# Patient Record
Sex: MALE | Race: WHITE | NOT HISPANIC OR LATINO | Employment: OTHER | URBAN - NONMETROPOLITAN AREA
[De-identification: names, ages, dates, MRNs, and addresses within clinical notes are randomized per-mention and may not be internally consistent; named-entity substitution may affect disease eponyms.]

---

## 2020-12-22 ENCOUNTER — HOSPITAL ENCOUNTER (OUTPATIENT)
Facility: HOSPITAL | Age: 80
Setting detail: HOSPITAL OUTPATIENT SURGERY
End: 2020-12-22
Attending: OPHTHALMOLOGY | Admitting: OPHTHALMOLOGY

## 2022-11-29 ENCOUNTER — APPOINTMENT (OUTPATIENT)
Dept: CT IMAGING | Facility: HOSPITAL | Age: 82
End: 2022-11-29

## 2022-11-29 ENCOUNTER — APPOINTMENT (OUTPATIENT)
Dept: GENERAL RADIOLOGY | Facility: HOSPITAL | Age: 82
End: 2022-11-29

## 2022-11-29 ENCOUNTER — HOSPITAL ENCOUNTER (INPATIENT)
Facility: HOSPITAL | Age: 82
LOS: 2 days | Discharge: ANOTHER HOSPITAL | End: 2022-12-01
Attending: EMERGENCY MEDICINE | Admitting: INTERNAL MEDICINE

## 2022-11-29 ENCOUNTER — APPOINTMENT (OUTPATIENT)
Dept: MRI IMAGING | Facility: HOSPITAL | Age: 82
End: 2022-11-29

## 2022-11-29 DIAGNOSIS — R53.1 WEAKNESS GENERALIZED: ICD-10-CM

## 2022-11-29 DIAGNOSIS — J10.1 INFLUENZA A H1N1 INFECTION: Primary | ICD-10-CM

## 2022-11-29 LAB
ABO GROUP BLD: NORMAL
ABO GROUP BLD: NORMAL
ALBUMIN SERPL-MCNC: 3.91 G/DL (ref 3.5–5.2)
ALBUMIN/GLOB SERPL: 1.3 G/DL
ALP SERPL-CCNC: 65 U/L (ref 39–117)
ALT SERPL W P-5'-P-CCNC: 9 U/L (ref 1–41)
ANION GAP SERPL CALCULATED.3IONS-SCNC: 13.6 MMOL/L (ref 5–15)
APTT PPP: 34.3 SECONDS (ref 26.5–34.5)
AST SERPL-CCNC: 42 U/L (ref 1–40)
BACTERIA UR QL AUTO: ABNORMAL /HPF
BASOPHILS # BLD AUTO: 0.02 10*3/MM3 (ref 0–0.2)
BASOPHILS NFR BLD AUTO: 0.4 % (ref 0–1.5)
BILIRUB SERPL-MCNC: 0.6 MG/DL (ref 0–1.2)
BILIRUB UR QL STRIP: NEGATIVE
BLD GP AB SCN SERPL QL: NEGATIVE
BUN SERPL-MCNC: 25 MG/DL (ref 8–23)
BUN/CREAT SERPL: 14.1 (ref 7–25)
CALCIUM SPEC-SCNC: 8.9 MG/DL (ref 8.6–10.5)
CHLORIDE SERPL-SCNC: 103 MMOL/L (ref 98–107)
CLARITY UR: CLEAR
CO2 SERPL-SCNC: 23.4 MMOL/L (ref 22–29)
COLOR UR: YELLOW
CREAT BLDA-MCNC: 1.8 MG/DL (ref 0.6–1.3)
CREAT SERPL-MCNC: 1.77 MG/DL (ref 0.76–1.27)
D-LACTATE SERPL-SCNC: 1.4 MMOL/L (ref 0.5–2)
DEPRECATED RDW RBC AUTO: 43.7 FL (ref 37–54)
EGFRCR SERPLBLD CKD-EPI 2021: 38.1 ML/MIN/1.73
EOSINOPHIL # BLD AUTO: 0.04 10*3/MM3 (ref 0–0.4)
EOSINOPHIL NFR BLD AUTO: 0.8 % (ref 0.3–6.2)
ERYTHROCYTE [DISTWIDTH] IN BLOOD BY AUTOMATED COUNT: 13.2 % (ref 12.3–15.4)
FLUAV RNA RESP QL NAA+PROBE: DETECTED
FLUBV RNA RESP QL NAA+PROBE: NOT DETECTED
GLOBULIN UR ELPH-MCNC: 3 GM/DL
GLUCOSE BLDC GLUCOMTR-MCNC: 84 MG/DL (ref 70–130)
GLUCOSE SERPL-MCNC: 95 MG/DL (ref 65–99)
GLUCOSE UR STRIP-MCNC: NEGATIVE MG/DL
HCT VFR BLD AUTO: 40.8 % (ref 37.5–51)
HGB BLD-MCNC: 13.5 G/DL (ref 13–17.7)
HGB UR QL STRIP.AUTO: ABNORMAL
HOLD SPECIMEN: NORMAL
HOLD SPECIMEN: NORMAL
HYALINE CASTS UR QL AUTO: ABNORMAL /LPF
IMM GRANULOCYTES # BLD AUTO: 0.03 10*3/MM3 (ref 0–0.05)
IMM GRANULOCYTES NFR BLD AUTO: 0.6 % (ref 0–0.5)
INR PPP: 1.06 (ref 0.9–1.1)
KETONES UR QL STRIP: ABNORMAL
LEUKOCYTE ESTERASE UR QL STRIP.AUTO: NEGATIVE
LYMPHOCYTES # BLD AUTO: 0.4 10*3/MM3 (ref 0.7–3.1)
LYMPHOCYTES NFR BLD AUTO: 7.7 % (ref 19.6–45.3)
MAGNESIUM SERPL-MCNC: 2 MG/DL (ref 1.6–2.4)
MCH RBC QN AUTO: 30.1 PG (ref 26.6–33)
MCHC RBC AUTO-ENTMCNC: 33.1 G/DL (ref 31.5–35.7)
MCV RBC AUTO: 90.9 FL (ref 79–97)
MONOCYTES # BLD AUTO: 0.51 10*3/MM3 (ref 0.1–0.9)
MONOCYTES NFR BLD AUTO: 9.9 % (ref 5–12)
NEUTROPHILS NFR BLD AUTO: 4.17 10*3/MM3 (ref 1.7–7)
NEUTROPHILS NFR BLD AUTO: 80.6 % (ref 42.7–76)
NITRITE UR QL STRIP: NEGATIVE
NRBC BLD AUTO-RTO: 0 /100 WBC (ref 0–0.2)
NT-PROBNP SERPL-MCNC: 1698 PG/ML (ref 0–1800)
PH UR STRIP.AUTO: 5.5 [PH] (ref 5–8)
PLATELET # BLD AUTO: 138 10*3/MM3 (ref 140–450)
PMV BLD AUTO: 9.6 FL (ref 6–12)
POTASSIUM SERPL-SCNC: 4 MMOL/L (ref 3.5–5.2)
PROT SERPL-MCNC: 6.9 G/DL (ref 6–8.5)
PROT UR QL STRIP: ABNORMAL
PROTHROMBIN TIME: 14 SECONDS (ref 12.1–14.7)
RBC # BLD AUTO: 4.49 10*6/MM3 (ref 4.14–5.8)
RBC # UR STRIP: ABNORMAL /HPF
REF LAB TEST METHOD: ABNORMAL
RH BLD: POSITIVE
RH BLD: POSITIVE
SARS-COV-2 RNA RESP QL NAA+PROBE: NOT DETECTED
SODIUM SERPL-SCNC: 140 MMOL/L (ref 136–145)
SP GR UR STRIP: >1.03 (ref 1–1.03)
SQUAMOUS #/AREA URNS HPF: ABNORMAL /HPF
T&S EXPIRATION DATE: NORMAL
TROPONIN T SERPL-MCNC: <0.01 NG/ML (ref 0–0.03)
TROPONIN T SERPL-MCNC: <0.01 NG/ML (ref 0–0.03)
UROBILINOGEN UR QL STRIP: ABNORMAL
WBC # UR STRIP: ABNORMAL /HPF
WBC NRBC COR # BLD: 5.17 10*3/MM3 (ref 3.4–10.8)
WHOLE BLOOD HOLD COAG: NORMAL
WHOLE BLOOD HOLD SPECIMEN: NORMAL

## 2022-11-29 PROCEDURE — 70450 CT HEAD/BRAIN W/O DYE: CPT

## 2022-11-29 PROCEDURE — 82962 GLUCOSE BLOOD TEST: CPT

## 2022-11-29 PROCEDURE — 72128 CT CHEST SPINE W/O DYE: CPT

## 2022-11-29 PROCEDURE — 93010 ELECTROCARDIOGRAM REPORT: CPT | Performed by: INTERNAL MEDICINE

## 2022-11-29 PROCEDURE — 83735 ASSAY OF MAGNESIUM: CPT | Performed by: EMERGENCY MEDICINE

## 2022-11-29 PROCEDURE — 86901 BLOOD TYPING SEROLOGIC RH(D): CPT | Performed by: STUDENT IN AN ORGANIZED HEALTH CARE EDUCATION/TRAINING PROGRAM

## 2022-11-29 PROCEDURE — 70498 CT ANGIOGRAPHY NECK: CPT | Performed by: RADIOLOGY

## 2022-11-29 PROCEDURE — 71045 X-RAY EXAM CHEST 1 VIEW: CPT

## 2022-11-29 PROCEDURE — 70498 CT ANGIOGRAPHY NECK: CPT

## 2022-11-29 PROCEDURE — 84484 ASSAY OF TROPONIN QUANT: CPT | Performed by: EMERGENCY MEDICINE

## 2022-11-29 PROCEDURE — 86901 BLOOD TYPING SEROLOGIC RH(D): CPT

## 2022-11-29 PROCEDURE — 81001 URINALYSIS AUTO W/SCOPE: CPT | Performed by: EMERGENCY MEDICINE

## 2022-11-29 PROCEDURE — 86850 RBC ANTIBODY SCREEN: CPT | Performed by: STUDENT IN AN ORGANIZED HEALTH CARE EDUCATION/TRAINING PROGRAM

## 2022-11-29 PROCEDURE — 93005 ELECTROCARDIOGRAM TRACING: CPT | Performed by: STUDENT IN AN ORGANIZED HEALTH CARE EDUCATION/TRAINING PROGRAM

## 2022-11-29 PROCEDURE — 83880 ASSAY OF NATRIURETIC PEPTIDE: CPT | Performed by: EMERGENCY MEDICINE

## 2022-11-29 PROCEDURE — 36415 COLL VENOUS BLD VENIPUNCTURE: CPT

## 2022-11-29 PROCEDURE — 83605 ASSAY OF LACTIC ACID: CPT | Performed by: EMERGENCY MEDICINE

## 2022-11-29 PROCEDURE — 80053 COMPREHEN METABOLIC PANEL: CPT | Performed by: STUDENT IN AN ORGANIZED HEALTH CARE EDUCATION/TRAINING PROGRAM

## 2022-11-29 PROCEDURE — 70450 CT HEAD/BRAIN W/O DYE: CPT | Performed by: RADIOLOGY

## 2022-11-29 PROCEDURE — 0 IOPAMIDOL PER 1 ML: Performed by: EMERGENCY MEDICINE

## 2022-11-29 PROCEDURE — 86900 BLOOD TYPING SEROLOGIC ABO: CPT | Performed by: STUDENT IN AN ORGANIZED HEALTH CARE EDUCATION/TRAINING PROGRAM

## 2022-11-29 PROCEDURE — 84484 ASSAY OF TROPONIN QUANT: CPT | Performed by: STUDENT IN AN ORGANIZED HEALTH CARE EDUCATION/TRAINING PROGRAM

## 2022-11-29 PROCEDURE — 70496 CT ANGIOGRAPHY HEAD: CPT

## 2022-11-29 PROCEDURE — 87040 BLOOD CULTURE FOR BACTERIA: CPT | Performed by: EMERGENCY MEDICINE

## 2022-11-29 PROCEDURE — 82565 ASSAY OF CREATININE: CPT

## 2022-11-29 PROCEDURE — 87636 SARSCOV2 & INF A&B AMP PRB: CPT | Performed by: EMERGENCY MEDICINE

## 2022-11-29 PROCEDURE — 4A03X5D MEASUREMENT OF ARTERIAL FLOW, INTRACRANIAL, EXTERNAL APPROACH: ICD-10-PCS | Performed by: STUDENT IN AN ORGANIZED HEALTH CARE EDUCATION/TRAINING PROGRAM

## 2022-11-29 PROCEDURE — 70551 MRI BRAIN STEM W/O DYE: CPT

## 2022-11-29 PROCEDURE — 71045 X-RAY EXAM CHEST 1 VIEW: CPT | Performed by: RADIOLOGY

## 2022-11-29 PROCEDURE — 70496 CT ANGIOGRAPHY HEAD: CPT | Performed by: RADIOLOGY

## 2022-11-29 PROCEDURE — 72131 CT LUMBAR SPINE W/O DYE: CPT

## 2022-11-29 PROCEDURE — 85610 PROTHROMBIN TIME: CPT | Performed by: STUDENT IN AN ORGANIZED HEALTH CARE EDUCATION/TRAINING PROGRAM

## 2022-11-29 PROCEDURE — 25010000002 HEPARIN (PORCINE) PER 1000 UNITS: Performed by: INTERNAL MEDICINE

## 2022-11-29 PROCEDURE — 99223 1ST HOSP IP/OBS HIGH 75: CPT | Performed by: INTERNAL MEDICINE

## 2022-11-29 PROCEDURE — 86900 BLOOD TYPING SEROLOGIC ABO: CPT

## 2022-11-29 PROCEDURE — 85025 COMPLETE CBC W/AUTO DIFF WBC: CPT | Performed by: STUDENT IN AN ORGANIZED HEALTH CARE EDUCATION/TRAINING PROGRAM

## 2022-11-29 PROCEDURE — 99285 EMERGENCY DEPT VISIT HI MDM: CPT

## 2022-11-29 PROCEDURE — 85730 THROMBOPLASTIN TIME PARTIAL: CPT | Performed by: STUDENT IN AN ORGANIZED HEALTH CARE EDUCATION/TRAINING PROGRAM

## 2022-11-29 RX ORDER — OSELTAMIVIR PHOSPHATE 6 MG/ML
30 FOR SUSPENSION ORAL EVERY 12 HOURS SCHEDULED
Status: DISCONTINUED | OUTPATIENT
Start: 2022-11-30 | End: 2022-11-29

## 2022-11-29 RX ORDER — ASPIRIN 81 MG/1
324 TABLET, CHEWABLE ORAL ONCE
Status: COMPLETED | OUTPATIENT
Start: 2022-11-29 | End: 2022-11-29

## 2022-11-29 RX ORDER — SODIUM CHLORIDE 9 MG/ML
40 INJECTION, SOLUTION INTRAVENOUS AS NEEDED
Status: DISCONTINUED | OUTPATIENT
Start: 2022-11-29 | End: 2022-12-01 | Stop reason: HOSPADM

## 2022-11-29 RX ORDER — ASPIRIN 81 MG/1
81 TABLET ORAL DAILY
Status: DISCONTINUED | OUTPATIENT
Start: 2022-11-30 | End: 2022-11-29

## 2022-11-29 RX ORDER — NICOTINE POLACRILEX 4 MG
15 LOZENGE BUCCAL
Status: DISCONTINUED | OUTPATIENT
Start: 2022-11-29 | End: 2022-12-01 | Stop reason: HOSPADM

## 2022-11-29 RX ORDER — HEPARIN SODIUM 5000 [USP'U]/ML
5000 INJECTION, SOLUTION INTRAVENOUS; SUBCUTANEOUS EVERY 12 HOURS SCHEDULED
Status: DISCONTINUED | OUTPATIENT
Start: 2022-11-29 | End: 2022-12-01

## 2022-11-29 RX ORDER — BUDESONIDE AND FORMOTEROL FUMARATE DIHYDRATE 160; 4.5 UG/1; UG/1
2 AEROSOL RESPIRATORY (INHALATION)
Status: DISCONTINUED | OUTPATIENT
Start: 2022-11-30 | End: 2022-12-01 | Stop reason: HOSPADM

## 2022-11-29 RX ORDER — SODIUM CHLORIDE 9 MG/ML
100 INJECTION, SOLUTION INTRAVENOUS CONTINUOUS
Status: DISCONTINUED | OUTPATIENT
Start: 2022-11-29 | End: 2022-12-01 | Stop reason: HOSPADM

## 2022-11-29 RX ORDER — SODIUM CHLORIDE 0.9 % (FLUSH) 0.9 %
10 SYRINGE (ML) INJECTION EVERY 12 HOURS SCHEDULED
Status: DISCONTINUED | OUTPATIENT
Start: 2022-11-29 | End: 2022-12-01 | Stop reason: HOSPADM

## 2022-11-29 RX ORDER — QUETIAPINE FUMARATE 25 MG/1
12.5 TABLET, FILM COATED ORAL NIGHTLY PRN
Status: DISCONTINUED | OUTPATIENT
Start: 2022-11-29 | End: 2022-12-01 | Stop reason: HOSPADM

## 2022-11-29 RX ORDER — OSELTAMIVIR PHOSPHATE 75 MG/1
75 CAPSULE ORAL ONCE
Status: DISCONTINUED | OUTPATIENT
Start: 2022-11-29 | End: 2022-11-30

## 2022-11-29 RX ORDER — ACETAMINOPHEN 325 MG/1
650 TABLET ORAL EVERY 6 HOURS PRN
Status: DISCONTINUED | OUTPATIENT
Start: 2022-11-29 | End: 2022-12-01 | Stop reason: HOSPADM

## 2022-11-29 RX ORDER — IPRATROPIUM BROMIDE AND ALBUTEROL SULFATE 2.5; .5 MG/3ML; MG/3ML
3 SOLUTION RESPIRATORY (INHALATION)
Status: DISCONTINUED | OUTPATIENT
Start: 2022-11-30 | End: 2022-12-01 | Stop reason: HOSPADM

## 2022-11-29 RX ORDER — ATORVASTATIN CALCIUM 40 MG/1
40 TABLET, FILM COATED ORAL NIGHTLY
Status: DISCONTINUED | OUTPATIENT
Start: 2022-11-29 | End: 2022-12-01 | Stop reason: HOSPADM

## 2022-11-29 RX ORDER — CHOLECALCIFEROL (VITAMIN D3) 125 MCG
5 CAPSULE ORAL NIGHTLY PRN
Status: DISCONTINUED | OUTPATIENT
Start: 2022-11-29 | End: 2022-12-01 | Stop reason: HOSPADM

## 2022-11-29 RX ORDER — ASPIRIN 325 MG
325 TABLET, DELAYED RELEASE (ENTERIC COATED) ORAL DAILY
Status: DISCONTINUED | OUTPATIENT
Start: 2022-11-30 | End: 2022-12-01

## 2022-11-29 RX ORDER — DEXTROSE MONOHYDRATE 25 G/50ML
25 INJECTION, SOLUTION INTRAVENOUS
Status: DISCONTINUED | OUTPATIENT
Start: 2022-11-29 | End: 2022-12-01 | Stop reason: HOSPADM

## 2022-11-29 RX ORDER — SODIUM CHLORIDE 0.9 % (FLUSH) 0.9 %
10 SYRINGE (ML) INJECTION AS NEEDED
Status: DISCONTINUED | OUTPATIENT
Start: 2022-11-29 | End: 2022-12-01 | Stop reason: HOSPADM

## 2022-11-29 RX ORDER — NITROGLYCERIN 0.4 MG/1
0.4 TABLET SUBLINGUAL
Status: DISCONTINUED | OUTPATIENT
Start: 2022-11-29 | End: 2022-12-01 | Stop reason: HOSPADM

## 2022-11-29 RX ADMIN — SODIUM CHLORIDE 500 ML: 9 INJECTION, SOLUTION INTRAVENOUS at 16:33

## 2022-11-29 RX ADMIN — IOPAMIDOL 60 ML: 755 INJECTION, SOLUTION INTRAVENOUS at 16:04

## 2022-11-29 RX ADMIN — Medication 10 ML: at 22:06

## 2022-11-29 RX ADMIN — ATORVASTATIN CALCIUM 40 MG: 40 TABLET, FILM COATED ORAL at 23:44

## 2022-11-29 RX ADMIN — Medication 5 MG: at 22:50

## 2022-11-29 RX ADMIN — SODIUM CHLORIDE 500 ML: 9 INJECTION, SOLUTION INTRAVENOUS at 16:32

## 2022-11-29 RX ADMIN — SODIUM CHLORIDE 100 ML/HR: 9 INJECTION, SOLUTION INTRAVENOUS at 22:06

## 2022-11-29 RX ADMIN — ASPIRIN 324 MG: 81 TABLET, CHEWABLE ORAL at 17:56

## 2022-11-29 RX ADMIN — HEPARIN SODIUM 5000 UNITS: 5000 INJECTION INTRAVENOUS; SUBCUTANEOUS at 22:50

## 2022-11-30 ENCOUNTER — APPOINTMENT (OUTPATIENT)
Dept: MRI IMAGING | Facility: HOSPITAL | Age: 82
End: 2022-11-30

## 2022-11-30 ENCOUNTER — APPOINTMENT (OUTPATIENT)
Dept: ULTRASOUND IMAGING | Facility: HOSPITAL | Age: 82
End: 2022-11-30

## 2022-11-30 LAB
25(OH)D3 SERPL-MCNC: 9 NG/ML (ref 30–100)
ALBUMIN SERPL-MCNC: 3.59 G/DL (ref 3.5–5.2)
ALBUMIN/GLOB SERPL: 1.2 G/DL
ALP SERPL-CCNC: 61 U/L (ref 39–117)
ALT SERPL W P-5'-P-CCNC: 16 U/L (ref 1–41)
ANION GAP SERPL CALCULATED.3IONS-SCNC: 12.8 MMOL/L (ref 5–15)
AST SERPL-CCNC: 83 U/L (ref 1–40)
BASOPHILS # BLD AUTO: 0.02 10*3/MM3 (ref 0–0.2)
BASOPHILS NFR BLD AUTO: 0.4 % (ref 0–1.5)
BILIRUB SERPL-MCNC: 0.5 MG/DL (ref 0–1.2)
BUN SERPL-MCNC: 23 MG/DL (ref 8–23)
BUN/CREAT SERPL: 15.5 (ref 7–25)
CALCIUM SPEC-SCNC: 8.5 MG/DL (ref 8.6–10.5)
CHLORIDE SERPL-SCNC: 102 MMOL/L (ref 98–107)
CHOLEST SERPL-MCNC: 148 MG/DL (ref 0–200)
CO2 SERPL-SCNC: 20.2 MMOL/L (ref 22–29)
CREAT SERPL-MCNC: 1.48 MG/DL (ref 0.76–1.27)
DEPRECATED RDW RBC AUTO: 44.9 FL (ref 37–54)
EGFRCR SERPLBLD CKD-EPI 2021: 47.2 ML/MIN/1.73
EOSINOPHIL # BLD AUTO: 0.02 10*3/MM3 (ref 0–0.4)
EOSINOPHIL NFR BLD AUTO: 0.4 % (ref 0.3–6.2)
ERYTHROCYTE [DISTWIDTH] IN BLOOD BY AUTOMATED COUNT: 13.3 % (ref 12.3–15.4)
GLOBULIN UR ELPH-MCNC: 2.9 GM/DL
GLUCOSE BLDC GLUCOMTR-MCNC: 73 MG/DL (ref 70–130)
GLUCOSE BLDC GLUCOMTR-MCNC: 81 MG/DL (ref 70–130)
GLUCOSE BLDC GLUCOMTR-MCNC: 89 MG/DL (ref 70–130)
GLUCOSE BLDC GLUCOMTR-MCNC: 99 MG/DL (ref 70–130)
GLUCOSE SERPL-MCNC: 90 MG/DL (ref 65–99)
HBA1C MFR BLD: 5.8 % (ref 4.8–5.6)
HCT VFR BLD AUTO: 39.5 % (ref 37.5–51)
HDLC SERPL-MCNC: 47 MG/DL (ref 40–60)
HGB BLD-MCNC: 13 G/DL (ref 13–17.7)
IMM GRANULOCYTES # BLD AUTO: 0.04 10*3/MM3 (ref 0–0.05)
IMM GRANULOCYTES NFR BLD AUTO: 0.8 % (ref 0–0.5)
LDLC SERPL CALC-MCNC: 90 MG/DL (ref 0–100)
LDLC/HDLC SERPL: 1.92 {RATIO}
LYMPHOCYTES # BLD AUTO: 0.33 10*3/MM3 (ref 0.7–3.1)
LYMPHOCYTES NFR BLD AUTO: 6.5 % (ref 19.6–45.3)
MCH RBC QN AUTO: 30.3 PG (ref 26.6–33)
MCHC RBC AUTO-ENTMCNC: 32.9 G/DL (ref 31.5–35.7)
MCV RBC AUTO: 92.1 FL (ref 79–97)
MONOCYTES # BLD AUTO: 0.49 10*3/MM3 (ref 0.1–0.9)
MONOCYTES NFR BLD AUTO: 9.7 % (ref 5–12)
NEUTROPHILS NFR BLD AUTO: 4.14 10*3/MM3 (ref 1.7–7)
NEUTROPHILS NFR BLD AUTO: 82.2 % (ref 42.7–76)
NRBC BLD AUTO-RTO: 0 /100 WBC (ref 0–0.2)
PLATELET # BLD AUTO: 114 10*3/MM3 (ref 140–450)
PMV BLD AUTO: 10 FL (ref 6–12)
POTASSIUM SERPL-SCNC: 4.2 MMOL/L (ref 3.5–5.2)
PROT SERPL-MCNC: 6.5 G/DL (ref 6–8.5)
QT INTERVAL: 412 MS
QTC INTERVAL: 472 MS
RBC # BLD AUTO: 4.29 10*6/MM3 (ref 4.14–5.8)
SODIUM SERPL-SCNC: 135 MMOL/L (ref 136–145)
T4 FREE SERPL-MCNC: 1.1 NG/DL (ref 0.93–1.7)
TRIGL SERPL-MCNC: 54 MG/DL (ref 0–150)
TSH SERPL DL<=0.05 MIU/L-ACNC: 1.62 UIU/ML (ref 0.27–4.2)
VIT B12 BLD-MCNC: 180 PG/ML (ref 211–946)
VLDLC SERPL-MCNC: 11 MG/DL (ref 5–40)
WBC NRBC COR # BLD: 5.04 10*3/MM3 (ref 3.4–10.8)

## 2022-11-30 PROCEDURE — 99232 SBSQ HOSP IP/OBS MODERATE 35: CPT | Performed by: STUDENT IN AN ORGANIZED HEALTH CARE EDUCATION/TRAINING PROGRAM

## 2022-11-30 PROCEDURE — 82306 VITAMIN D 25 HYDROXY: CPT | Performed by: INTERNAL MEDICINE

## 2022-11-30 PROCEDURE — 76775 US EXAM ABDO BACK WALL LIM: CPT

## 2022-11-30 PROCEDURE — 25010000002 HEPARIN (PORCINE) PER 1000 UNITS: Performed by: INTERNAL MEDICINE

## 2022-11-30 PROCEDURE — 94664 DEMO&/EVAL PT USE INHALER: CPT

## 2022-11-30 PROCEDURE — 82962 GLUCOSE BLOOD TEST: CPT

## 2022-11-30 PROCEDURE — 97162 PT EVAL MOD COMPLEX 30 MIN: CPT

## 2022-11-30 PROCEDURE — 82607 VITAMIN B-12: CPT | Performed by: INTERNAL MEDICINE

## 2022-11-30 PROCEDURE — 84439 ASSAY OF FREE THYROXINE: CPT | Performed by: INTERNAL MEDICINE

## 2022-11-30 PROCEDURE — 80061 LIPID PANEL: CPT | Performed by: INTERNAL MEDICINE

## 2022-11-30 PROCEDURE — 94640 AIRWAY INHALATION TREATMENT: CPT

## 2022-11-30 PROCEDURE — 97166 OT EVAL MOD COMPLEX 45 MIN: CPT

## 2022-11-30 PROCEDURE — 82570 ASSAY OF URINE CREATININE: CPT | Performed by: INTERNAL MEDICINE

## 2022-11-30 PROCEDURE — 76775 US EXAM ABDO BACK WALL LIM: CPT | Performed by: RADIOLOGY

## 2022-11-30 PROCEDURE — 72148 MRI LUMBAR SPINE W/O DYE: CPT

## 2022-11-30 PROCEDURE — 99232 SBSQ HOSP IP/OBS MODERATE 35: CPT | Performed by: NURSE PRACTITIONER

## 2022-11-30 PROCEDURE — 80053 COMPREHEN METABOLIC PANEL: CPT | Performed by: INTERNAL MEDICINE

## 2022-11-30 PROCEDURE — 94799 UNLISTED PULMONARY SVC/PX: CPT

## 2022-11-30 PROCEDURE — 72148 MRI LUMBAR SPINE W/O DYE: CPT | Performed by: RADIOLOGY

## 2022-11-30 PROCEDURE — 84156 ASSAY OF PROTEIN URINE: CPT | Performed by: INTERNAL MEDICINE

## 2022-11-30 PROCEDURE — 72146 MRI CHEST SPINE W/O DYE: CPT | Performed by: RADIOLOGY

## 2022-11-30 PROCEDURE — 92610 EVALUATE SWALLOWING FUNCTION: CPT

## 2022-11-30 PROCEDURE — 92523 SPEECH SOUND LANG COMPREHEN: CPT

## 2022-11-30 PROCEDURE — 84443 ASSAY THYROID STIM HORMONE: CPT | Performed by: INTERNAL MEDICINE

## 2022-11-30 PROCEDURE — 85025 COMPLETE CBC W/AUTO DIFF WBC: CPT | Performed by: INTERNAL MEDICINE

## 2022-11-30 PROCEDURE — 83036 HEMOGLOBIN GLYCOSYLATED A1C: CPT | Performed by: INTERNAL MEDICINE

## 2022-11-30 PROCEDURE — 94761 N-INVAS EAR/PLS OXIMETRY MLT: CPT

## 2022-11-30 PROCEDURE — 72146 MRI CHEST SPINE W/O DYE: CPT

## 2022-11-30 RX ORDER — NICOTINE 21 MG/24HR
1 PATCH, TRANSDERMAL 24 HOURS TRANSDERMAL
Status: DISCONTINUED | OUTPATIENT
Start: 2022-11-30 | End: 2022-12-01 | Stop reason: HOSPADM

## 2022-11-30 RX ORDER — LIDOCAINE 50 MG/G
1 PATCH TOPICAL
Status: DISCONTINUED | OUTPATIENT
Start: 2022-11-30 | End: 2022-12-01 | Stop reason: HOSPADM

## 2022-11-30 RX ORDER — HYDROCODONE BITARTRATE AND ACETAMINOPHEN 5; 325 MG/1; MG/1
1 TABLET ORAL EVERY 6 HOURS PRN
Status: DISCONTINUED | OUTPATIENT
Start: 2022-11-30 | End: 2022-12-01 | Stop reason: HOSPADM

## 2022-11-30 RX ADMIN — BUDESONIDE AND FORMOTEROL FUMARATE DIHYDRATE 2 PUFF: 160; 4.5 AEROSOL RESPIRATORY (INHALATION) at 07:32

## 2022-11-30 RX ADMIN — ASPIRIN 325 MG: 325 TABLET, COATED ORAL at 08:36

## 2022-11-30 RX ADMIN — IPRATROPIUM BROMIDE AND ALBUTEROL SULFATE 3 ML: .5; 2.5 SOLUTION RESPIRATORY (INHALATION) at 00:36

## 2022-11-30 RX ADMIN — ACETAMINOPHEN 650 MG: 325 TABLET ORAL at 07:28

## 2022-11-30 RX ADMIN — SODIUM CHLORIDE 100 ML/HR: 9 INJECTION, SOLUTION INTRAVENOUS at 19:13

## 2022-11-30 RX ADMIN — LIDOCAINE 1 PATCH: 50 PATCH CUTANEOUS at 15:16

## 2022-11-30 RX ADMIN — NICOTINE TRANSDERMAL SYSTEM 1 PATCH: 21 PATCH, EXTENDED RELEASE TRANSDERMAL at 08:34

## 2022-11-30 RX ADMIN — HYDROCODONE BITARTRATE AND ACETAMINOPHEN 1 TABLET: 5; 325 TABLET ORAL at 19:59

## 2022-11-30 RX ADMIN — IPRATROPIUM BROMIDE AND ALBUTEROL SULFATE 3 ML: .5; 2.5 SOLUTION RESPIRATORY (INHALATION) at 07:32

## 2022-11-30 RX ADMIN — ATORVASTATIN CALCIUM 40 MG: 40 TABLET, FILM COATED ORAL at 20:00

## 2022-11-30 RX ADMIN — IPRATROPIUM BROMIDE AND ALBUTEROL SULFATE 3 ML: .5; 2.5 SOLUTION RESPIRATORY (INHALATION) at 18:57

## 2022-11-30 RX ADMIN — HYDROCODONE BITARTRATE AND ACETAMINOPHEN 1 TABLET: 5; 325 TABLET ORAL at 13:28

## 2022-11-30 RX ADMIN — HEPARIN SODIUM 5000 UNITS: 5000 INJECTION INTRAVENOUS; SUBCUTANEOUS at 20:00

## 2022-11-30 RX ADMIN — Medication 10 ML: at 20:01

## 2022-11-30 RX ADMIN — BUDESONIDE AND FORMOTEROL FUMARATE DIHYDRATE 2 PUFF: 160; 4.5 AEROSOL RESPIRATORY (INHALATION) at 18:57

## 2022-11-30 RX ADMIN — QUETIAPINE FUMARATE 12.5 MG: 25 TABLET, FILM COATED ORAL at 20:00

## 2022-11-30 RX ADMIN — Medication 10 ML: at 10:01

## 2022-11-30 RX ADMIN — HEPARIN SODIUM 5000 UNITS: 5000 INJECTION INTRAVENOUS; SUBCUTANEOUS at 08:34

## 2022-12-01 ENCOUNTER — APPOINTMENT (OUTPATIENT)
Dept: CT IMAGING | Facility: HOSPITAL | Age: 82
End: 2022-12-01

## 2022-12-01 VITALS
DIASTOLIC BLOOD PRESSURE: 60 MMHG | BODY MASS INDEX: 18.48 KG/M2 | HEART RATE: 75 BPM | RESPIRATION RATE: 22 BRPM | HEIGHT: 72 IN | OXYGEN SATURATION: 91 % | WEIGHT: 136.47 LBS | SYSTOLIC BLOOD PRESSURE: 101 MMHG | TEMPERATURE: 98.9 F

## 2022-12-01 LAB
A-A DO2: 453.6 MMHG (ref 0–300)
ANION GAP SERPL CALCULATED.3IONS-SCNC: 11.1 MMOL/L (ref 5–15)
ARTERIAL PATENCY WRIST A: POSITIVE
ATMOSPHERIC PRESS: 739 MMHG
BASE EXCESS BLDA CALC-SCNC: -7.1 MMOL/L (ref 0–2)
BDY SITE: ABNORMAL
BODY TEMPERATURE: 0 C
BUN SERPL-MCNC: 24 MG/DL (ref 8–23)
BUN/CREAT SERPL: 19.2 (ref 7–25)
CALCIUM SPEC-SCNC: 7.8 MG/DL (ref 8.6–10.5)
CHLORIDE SERPL-SCNC: 105 MMOL/L (ref 98–107)
CO2 BLDA-SCNC: 18.9 MMOL/L (ref 22–33)
CO2 SERPL-SCNC: 19.9 MMOL/L (ref 22–29)
COHGB MFR BLD: 0.5 % (ref 0–5)
CREAT SERPL-MCNC: 1.25 MG/DL (ref 0.76–1.27)
CREAT UR-MCNC: 104.2 MG/DL
DEPRECATED RDW RBC AUTO: 46.1 FL (ref 37–54)
EGFRCR SERPLBLD CKD-EPI 2021: 57.9 ML/MIN/1.73
ERYTHROCYTE [DISTWIDTH] IN BLOOD BY AUTOMATED COUNT: 13.4 % (ref 12.3–15.4)
GAS FLOW AIRWAY: 45 LPM
GLUCOSE BLDC GLUCOMTR-MCNC: 106 MG/DL (ref 70–130)
GLUCOSE BLDC GLUCOMTR-MCNC: 53 MG/DL (ref 70–130)
GLUCOSE BLDC GLUCOMTR-MCNC: 74 MG/DL (ref 70–130)
GLUCOSE BLDC GLUCOMTR-MCNC: 96 MG/DL (ref 70–130)
GLUCOSE SERPL-MCNC: 100 MG/DL (ref 65–99)
HCO3 BLDA-SCNC: 17.9 MMOL/L (ref 20–26)
HCT VFR BLD AUTO: 37.2 % (ref 37.5–51)
HCT VFR BLD AUTO: 38.9 % (ref 37.5–51)
HCT VFR BLD CALC: 33.6 % (ref 38–51)
HGB BLD-MCNC: 11.9 G/DL (ref 13–17.7)
HGB BLD-MCNC: 12.1 G/DL (ref 13–17.7)
HGB BLDA-MCNC: 11 G/DL (ref 14–18)
INHALED O2 CONCENTRATION: 88 %
Lab: ABNORMAL
MCH RBC QN AUTO: 30.1 PG (ref 26.6–33)
MCHC RBC AUTO-ENTMCNC: 32.5 G/DL (ref 31.5–35.7)
MCV RBC AUTO: 92.5 FL (ref 79–97)
METHGB BLD QL: 0.1 % (ref 0–3)
MODALITY: ABNORMAL
NOTE: ABNORMAL
OXYHGB MFR BLDV: 98.1 % (ref 94–99)
PCO2 BLDA: 33.1 MM HG (ref 35–45)
PCO2 TEMP ADJ BLD: ABNORMAL MM[HG]
PH BLDA: 7.34 PH UNITS (ref 7.35–7.45)
PH, TEMP CORRECTED: ABNORMAL
PLATELET # BLD AUTO: 98 10*3/MM3 (ref 140–450)
PMV BLD AUTO: 9.8 FL (ref 6–12)
PO2 BLDA: 121 MM HG (ref 83–108)
PO2 TEMP ADJ BLD: ABNORMAL MM[HG]
POTASSIUM SERPL-SCNC: 4.1 MMOL/L (ref 3.5–5.2)
PROT ?TM UR-MCNC: 57.1 MG/DL
PROT/CREAT UR: 548 MG/G CREA (ref 0–200)
RBC # BLD AUTO: 4.02 10*6/MM3 (ref 4.14–5.8)
SAO2 % BLDCOA: 98.7 % (ref 94–99)
SODIUM SERPL-SCNC: 136 MMOL/L (ref 136–145)
VENTILATOR MODE: ABNORMAL
WBC NRBC COR # BLD: 4.57 10*3/MM3 (ref 3.4–10.8)

## 2022-12-01 PROCEDURE — 70486 CT MAXILLOFACIAL W/O DYE: CPT

## 2022-12-01 PROCEDURE — 94799 UNLISTED PULMONARY SVC/PX: CPT

## 2022-12-01 PROCEDURE — 70450 CT HEAD/BRAIN W/O DYE: CPT

## 2022-12-01 PROCEDURE — 85018 HEMOGLOBIN: CPT | Performed by: INTERNAL MEDICINE

## 2022-12-01 PROCEDURE — 85014 HEMATOCRIT: CPT | Performed by: INTERNAL MEDICINE

## 2022-12-01 PROCEDURE — 82375 ASSAY CARBOXYHB QUANT: CPT

## 2022-12-01 PROCEDURE — 70450 CT HEAD/BRAIN W/O DYE: CPT | Performed by: RADIOLOGY

## 2022-12-01 PROCEDURE — 70486 CT MAXILLOFACIAL W/O DYE: CPT | Performed by: RADIOLOGY

## 2022-12-01 PROCEDURE — 93005 ELECTROCARDIOGRAM TRACING: CPT | Performed by: INTERNAL MEDICINE

## 2022-12-01 PROCEDURE — 80048 BASIC METABOLIC PNL TOTAL CA: CPT | Performed by: STUDENT IN AN ORGANIZED HEALTH CARE EDUCATION/TRAINING PROGRAM

## 2022-12-01 PROCEDURE — 25010000002 DEXAMETHASONE PER 1 MG: Performed by: STUDENT IN AN ORGANIZED HEALTH CARE EDUCATION/TRAINING PROGRAM

## 2022-12-01 PROCEDURE — 82962 GLUCOSE BLOOD TEST: CPT

## 2022-12-01 PROCEDURE — 83050 HGB METHEMOGLOBIN QUAN: CPT

## 2022-12-01 PROCEDURE — 99239 HOSP IP/OBS DSCHRG MGMT >30: CPT | Performed by: STUDENT IN AN ORGANIZED HEALTH CARE EDUCATION/TRAINING PROGRAM

## 2022-12-01 PROCEDURE — 99291 CRITICAL CARE FIRST HOUR: CPT | Performed by: STUDENT IN AN ORGANIZED HEALTH CARE EDUCATION/TRAINING PROGRAM

## 2022-12-01 PROCEDURE — 93010 ELECTROCARDIOGRAM REPORT: CPT | Performed by: INTERNAL MEDICINE

## 2022-12-01 PROCEDURE — 25010000002 DIPHENHYDRAMINE PER 50 MG: Performed by: PHYSICIAN ASSISTANT

## 2022-12-01 PROCEDURE — 25010000002 LEVETIRACETAM IN NACL 0.82% 500 MG/100ML SOLUTION: Performed by: NURSE PRACTITIONER

## 2022-12-01 PROCEDURE — 82805 BLOOD GASES W/O2 SATURATION: CPT

## 2022-12-01 PROCEDURE — 99292 CRITICAL CARE ADDL 30 MIN: CPT | Performed by: STUDENT IN AN ORGANIZED HEALTH CARE EDUCATION/TRAINING PROGRAM

## 2022-12-01 PROCEDURE — 25010000002 DEXAMETHASONE SODIUM PHOSPHATE 10 MG/ML SOLUTION: Performed by: STUDENT IN AN ORGANIZED HEALTH CARE EDUCATION/TRAINING PROGRAM

## 2022-12-01 PROCEDURE — 25010000002 ZIPRASIDONE MESYLATE PER 10 MG: Performed by: STUDENT IN AN ORGANIZED HEALTH CARE EDUCATION/TRAINING PROGRAM

## 2022-12-01 PROCEDURE — 0 LEVETIRACETAM IN NACL 0.75% 1000 MG/100ML SOLUTION: Performed by: NURSE PRACTITIONER

## 2022-12-01 PROCEDURE — 36600 WITHDRAWAL OF ARTERIAL BLOOD: CPT

## 2022-12-01 PROCEDURE — 99232 SBSQ HOSP IP/OBS MODERATE 35: CPT | Performed by: NURSE PRACTITIONER

## 2022-12-01 PROCEDURE — 85027 COMPLETE CBC AUTOMATED: CPT | Performed by: STUDENT IN AN ORGANIZED HEALTH CARE EDUCATION/TRAINING PROGRAM

## 2022-12-01 PROCEDURE — 93005 ELECTROCARDIOGRAM TRACING: CPT | Performed by: PHYSICIAN ASSISTANT

## 2022-12-01 RX ORDER — SODIUM CHLORIDE 9 MG/ML
100 INJECTION, SOLUTION INTRAVENOUS CONTINUOUS
Start: 2022-12-01

## 2022-12-01 RX ORDER — DIPHENHYDRAMINE HYDROCHLORIDE 50 MG/ML
50 INJECTION INTRAMUSCULAR; INTRAVENOUS ONCE
Status: COMPLETED | OUTPATIENT
Start: 2022-12-01 | End: 2022-12-01

## 2022-12-01 RX ORDER — METOPROLOL TARTRATE 5 MG/5ML
5 INJECTION INTRAVENOUS ONCE
Status: COMPLETED | OUTPATIENT
Start: 2022-12-01 | End: 2022-12-01

## 2022-12-01 RX ORDER — METOPROLOL TARTRATE 5 MG/5ML
INJECTION INTRAVENOUS
Status: COMPLETED
Start: 2022-12-01 | End: 2022-12-01

## 2022-12-01 RX ORDER — ACETAMINOPHEN 650 MG/1
650 SUPPOSITORY RECTAL EVERY 6 HOURS PRN
Status: DISCONTINUED | OUTPATIENT
Start: 2022-12-01 | End: 2022-12-01 | Stop reason: HOSPADM

## 2022-12-01 RX ORDER — HYDROXYZINE HYDROCHLORIDE 25 MG/1
25 TABLET, FILM COATED ORAL 3 TIMES DAILY PRN
Status: DISCONTINUED | OUTPATIENT
Start: 2022-12-01 | End: 2022-12-01 | Stop reason: HOSPADM

## 2022-12-01 RX ORDER — DEXAMETHASONE SODIUM PHOSPHATE 4 MG/ML
4 INJECTION, SOLUTION INTRA-ARTICULAR; INTRALESIONAL; INTRAMUSCULAR; INTRAVENOUS; SOFT TISSUE EVERY 6 HOURS
Qty: 3 ML | Refills: 0
Start: 2022-12-01 | End: 2022-12-02

## 2022-12-01 RX ORDER — LEVETIRACETAM 10 MG/ML
1000 INJECTION INTRAVASCULAR ONCE
Status: COMPLETED | OUTPATIENT
Start: 2022-12-01 | End: 2022-12-01

## 2022-12-01 RX ORDER — DEXAMETHASONE SODIUM PHOSPHATE 10 MG/ML
10 INJECTION, SOLUTION INTRAMUSCULAR; INTRAVENOUS ONCE
Status: COMPLETED | OUTPATIENT
Start: 2022-12-01 | End: 2022-12-01

## 2022-12-01 RX ORDER — LEVETIRACETAM 5 MG/ML
500 INJECTION INTRAVASCULAR EVERY 12 HOURS SCHEDULED
Status: DISCONTINUED | OUTPATIENT
Start: 2022-12-01 | End: 2022-12-01 | Stop reason: HOSPADM

## 2022-12-01 RX ORDER — DEXAMETHASONE SODIUM PHOSPHATE 4 MG/ML
4 INJECTION, SOLUTION INTRA-ARTICULAR; INTRALESIONAL; INTRAMUSCULAR; INTRAVENOUS; SOFT TISSUE EVERY 6 HOURS
Status: DISCONTINUED | OUTPATIENT
Start: 2022-12-01 | End: 2022-12-01 | Stop reason: HOSPADM

## 2022-12-01 RX ADMIN — SODIUM CHLORIDE 100 ML/HR: 9 INJECTION, SOLUTION INTRAVENOUS at 05:04

## 2022-12-01 RX ADMIN — METOPROLOL TARTRATE 5 MG: 5 INJECTION INTRAVENOUS at 06:37

## 2022-12-01 RX ADMIN — LEVETIRACETAM 500 MG: 5 INJECTION, SOLUTION INTRAVENOUS at 20:07

## 2022-12-01 RX ADMIN — SODIUM CHLORIDE 100 ML/HR: 9 INJECTION, SOLUTION INTRAVENOUS at 11:39

## 2022-12-01 RX ADMIN — DEXAMETHASONE SODIUM PHOSPHATE 10 MG: 10 INJECTION INTRAMUSCULAR; INTRAVENOUS at 13:54

## 2022-12-01 RX ADMIN — SODIUM CHLORIDE 1000 ML: 9 INJECTION, SOLUTION INTRAVENOUS at 05:30

## 2022-12-01 RX ADMIN — DIPHENHYDRAMINE HYDROCHLORIDE 50 MG: 50 INJECTION INTRAMUSCULAR; INTRAVENOUS at 01:17

## 2022-12-01 RX ADMIN — DEXAMETHASONE SODIUM PHOSPHATE 4 MG: 4 INJECTION, SOLUTION INTRA-ARTICULAR; INTRALESIONAL; INTRAMUSCULAR; INTRAVENOUS; SOFT TISSUE at 17:04

## 2022-12-01 RX ADMIN — ACETAMINOPHEN 650 MG: 650 SUPPOSITORY RECTAL at 11:39

## 2022-12-01 RX ADMIN — ZIPRASIDONE MESYLATE 10 MG: 20 INJECTION, POWDER, LYOPHILIZED, FOR SOLUTION INTRAMUSCULAR at 13:45

## 2022-12-01 RX ADMIN — IPRATROPIUM BROMIDE AND ALBUTEROL SULFATE 3 ML: .5; 2.5 SOLUTION RESPIRATORY (INHALATION) at 00:21

## 2022-12-01 RX ADMIN — Medication 10 ML: at 20:08

## 2022-12-01 RX ADMIN — METOPROLOL TARTRATE 5 MG: 1 INJECTION, SOLUTION INTRAVENOUS at 06:37

## 2022-12-01 RX ADMIN — SODIUM CHLORIDE 1000 ML: 9 INJECTION, SOLUTION INTRAVENOUS at 18:50

## 2022-12-01 RX ADMIN — LEVETIRACETAM 1000 MG: 10 INJECTION INTRAVENOUS at 10:12

## 2022-12-01 RX ADMIN — DEXTROSE MONOHYDRATE 25 G: 25 INJECTION, SOLUTION INTRAVENOUS at 10:25

## 2022-12-01 RX ADMIN — Medication 10 ML: at 09:41

## 2022-12-01 NOTE — PLAN OF CARE
Problem: Adult Inpatient Plan of Care  Goal: Plan of Care Review  Outcome: Ongoing, Not Progressing  Goal: Patient-Specific Goal (Individualized)  Outcome: Ongoing, Not Progressing  Goal: Absence of Hospital-Acquired Illness or Injury  Outcome: Ongoing, Not Progressing  Intervention: Identify and Manage Fall Risk  Recent Flowsheet Documentation  Taken 12/1/2022 1800 by Eda Griffin RN  Safety Promotion/Fall Prevention: safety round/check completed  Taken 12/1/2022 1700 by Eda Griffin RN  Safety Promotion/Fall Prevention: safety round/check completed  Taken 12/1/2022 1600 by Eda Griffin RN  Safety Promotion/Fall Prevention: safety round/check completed  Taken 12/1/2022 1500 by Eda Griffin RN  Safety Promotion/Fall Prevention: safety round/check completed  Taken 12/1/2022 1400 by Eda Griffin RN  Safety Promotion/Fall Prevention: safety round/check completed  Taken 12/1/2022 1300 by Eda Griffin RN  Safety Promotion/Fall Prevention: safety round/check completed  Taken 12/1/2022 1200 by Eda Griffin RN  Safety Promotion/Fall Prevention: safety round/check completed  Taken 12/1/2022 1100 by Eda Griffin RN  Safety Promotion/Fall Prevention: safety round/check completed  Taken 12/1/2022 1050 by Eda Griffin RN  Safety Promotion/Fall Prevention:   safety round/check completed   lighting adjusted   fall prevention program maintained  Intervention: Prevent Skin Injury  Recent Flowsheet Documentation  Taken 12/1/2022 1800 by Eda Griffin RN  Body Position:   tilted   right  Taken 12/1/2022 1600 by Eda Griffin RN  Body Position:   tilted   left  Taken 12/1/2022 1400 by Eda Griffin RN  Body Position:   tilted   right  Taken 12/1/2022 1200 by Eda Griffin RN  Body Position:   tilted   left  Taken 12/1/2022 1050 by Eda Griffin RN  Body Position:   tilted   right  Skin Protection:   adhesive use limited   incontinence pads utilized   pulse oximeter probe site changed    skin-to-device areas padded   transparent dressing maintained   tubing/devices free from skin contact  Intervention: Prevent and Manage VTE (Venous Thromboembolism) Risk  Recent Flowsheet Documentation  Taken 12/1/2022 1800 by Eda Griffin RN  Activity Management: bedrest  Taken 12/1/2022 1607 by Eda Griffin RN  VTE Prevention/Management:   bilateral   sequential compression devices on  Taken 12/1/2022 1600 by Eda Griffin RN  Activity Management: bedrest  Taken 12/1/2022 1400 by Eda Griffin RN  Activity Management: bedrest  VTE Prevention/Management: (see ORders) other (see comments)  Taken 12/1/2022 1200 by Eda Griffin RN  Activity Management: bedrest  Taken 12/1/2022 1050 by Eda Griffin RN  Activity Management: bedrest  VTE Prevention/Management: other (see comments)  Range of Motion: ROM (range of motion) performed  Goal: Optimal Comfort and Wellbeing  Outcome: Ongoing, Not Progressing  Intervention: Provide Person-Centered Care  Recent Flowsheet Documentation  Taken 12/1/2022 1400 by Eda Griffin RN  Trust Relationship/Rapport:   care explained   emotional support provided   reassurance provided  Goal: Readiness for Transition of Care  Outcome: Ongoing, Not Progressing     Problem: Fall Injury Risk  Goal: Absence of Fall and Fall-Related Injury  Outcome: Ongoing, Not Progressing  Intervention: Identify and Manage Contributors  Recent Flowsheet Documentation  Taken 12/1/2022 1800 by Eda Griffin RN  Medication Review/Management: medications reviewed  Taken 12/1/2022 1700 by Eda Griffin RN  Medication Review/Management: medications reviewed  Taken 12/1/2022 1600 by Eda Griffin RN  Medication Review/Management: medications reviewed  Taken 12/1/2022 1400 by Eda Griffin RN  Medication Review/Management: medications reviewed  Taken 12/1/2022 1300 by Eda Griffin RN  Medication Review/Management: medications reviewed  Taken 12/1/2022 1200 by Eda Griffin RN  Medication  Review/Management: medications reviewed  Taken 12/1/2022 1100 by Eda Griffin RN  Medication Review/Management: medications reviewed  Taken 12/1/2022 1050 by Eda Griffin RN  Medication Review/Management: medications reviewed  Intervention: Promote Injury-Free Environment  Recent Flowsheet Documentation  Taken 12/1/2022 1800 by Eda Griffin RN  Safety Promotion/Fall Prevention: safety round/check completed  Taken 12/1/2022 1700 by Eda Griffin RN  Safety Promotion/Fall Prevention: safety round/check completed  Taken 12/1/2022 1600 by Eda Griffin RN  Safety Promotion/Fall Prevention: safety round/check completed  Taken 12/1/2022 1500 by Eda Griffin RN  Safety Promotion/Fall Prevention: safety round/check completed  Taken 12/1/2022 1400 by Eda Griffin RN  Safety Promotion/Fall Prevention: safety round/check completed  Taken 12/1/2022 1300 by Eda Griffin RN  Safety Promotion/Fall Prevention: safety round/check completed  Taken 12/1/2022 1200 by Eda Griffin RN  Safety Promotion/Fall Prevention: safety round/check completed  Taken 12/1/2022 1100 by Eda Griffin RN  Safety Promotion/Fall Prevention: safety round/check completed  Taken 12/1/2022 1050 by Eda Griffin RN  Safety Promotion/Fall Prevention:   safety round/check completed   lighting adjusted   fall prevention program maintained     Problem: Skin Injury Risk Increased  Goal: Skin Health and Integrity  Outcome: Ongoing, Not Progressing  Intervention: Optimize Skin Protection  Recent Flowsheet Documentation  Taken 12/1/2022 1800 by Eda Griffin RN  Head of Bed (HOB) Positioning: HOB at 20-30 degrees  Taken 12/1/2022 1600 by Eda Griffin RN  Head of Bed (HOB) Positioning: HOB at 20-30 degrees  Taken 12/1/2022 1400 by Eda Griffin RN  Pressure Reduction Techniques:   heels elevated off bed   weight shift assistance provided  Head of Bed (HOB) Positioning: HOB at 30 degrees  Pressure Reduction Devices:   heel offloading  device utilized   positioning supports utilized   pressure-redistributing mattress utilized  Taken 12/1/2022 1200 by Eda Griffni, RN  Head of Bed ([facility]) Positioning: HOB at 30 degrees  Taken 12/1/2022 1050 by Eda Griffin RN  Pressure Reduction Techniques:   weight shift assistance provided   heels elevated off bed  Head of Bed ([facility]) Positioning: [facility] at 30 degrees  Pressure Reduction Devices:   pressure-redistributing mattress utilized   positioning supports utilized   heel offloading device utilized  Skin Protection:   adhesive use limited   incontinence pads utilized   pulse oximeter probe site changed   skin-to-device areas padded   transparent dressing maintained   tubing/devices free from skin contact

## 2022-12-01 NOTE — PLAN OF CARE
Goal Outcome Evaluation:      Pt has not rested well this shift. Pt has been increasingly confused as the night progresses. Pt has been reoriented several times. Pt has been getting up several times throughout the night. Pt has also been pulling on pulse ox, leads, and pulled an IV out. All have been replaced, prn meds given to help with relaxation. No s/s of acute distress noted at this time. Will continue with plan of care.

## 2022-12-01 NOTE — PROGRESS NOTES
Stroke Progress Note       Chief Complaint: Recurrent falls/weakness/confusion    Subjective    Subjective     Subjective:  Patient noted to have a fall this morning around 6:15 AM.  Staff responded to bed alarm and found patient lying in floor facedown with blood around him.  Per hospitalist who responded immediately to patient was face down in the floor with a pool of blood in urine.  She noted the patient had a left-sided periorbital edema along with a dilated and slow to react pupil on the left with left temporal hematoma as well as blood in posterior oropharynx raising concern for posterior nasopharyngeal bleeding.  Wound was closed by hospitalist and patient was sent for stat CT head and facial bones, results discussed below.  Attending had discussion with neurosurgery who felt that at that moment management was nonsurgical.  Attending also has talked to [facility] trauma team regarding facial fractures, transfer pending bed availability.  Per nursing and family patient had gradually become more conversant although he was not oriented, now drowsy after Geodon for agitation.    Review of Systems   Unable to perform ROS: Mental status change            Objective    Objective      Temp:  [98 °F (36.7 °C)-104 °F (40 °C)] 98.5 °F (36.9 °C)  Heart Rate:  [] 89  Resp:  [18-46] 26  BP: ()/() 93/66        Neurological Exam  Mental Status  Arousable to verbal stimuli. Severe dysarthria present. Speech: Patient made attempts at speaking, however he was very difficult to understand.    Cranial Nerves  CN III, IV, VI: Extraocular movements intact bilaterally.   Right pupil: 3 mm. Round.   Left pupil: 4 mm. Round. Sluggish reactivity.  CN IX, X: Palate elevates symmetrically  CN XII: Tongue midline without atrophy or fasciculations.    Motor  Decreased muscle bulk throughout. Normal muscle tone. No abnormal involuntary movements.  Spontaneously moving all 4 extremities, he would not follow commands.    Gait    Not  assessed.      Physical Exam  Vitals and nursing note reviewed.   Constitutional:       General: He is not in acute distress.     Appearance: He is ill-appearing.   HENT:      Head:      Comments: Dressing noted around head, periorbital edema/hematoma noted on the left with bruising extending slightly across midline     Mouth/Throat:      Mouth: Mucous membranes are dry.      Comments: Dried blood noted around posterior oropharynx, no tongue trauma  Eyes:      Extraocular Movements: Extraocular movements intact.   Cardiovascular:      Rate and Rhythm: Normal rate and regular rhythm.   Pulmonary:      Effort: Pulmonary effort is normal.   Musculoskeletal:         General: Normal range of motion.   Skin:     General: Skin is warm and dry.   Neurological:      Cranial Nerves: Dysarthria present.         Hospital Meds:  Scheduled- atorvastatin, 40 mg, Oral, Nightly  budesonide-formoterol, 2 puff, Inhalation, BID - RT  dexamethasone, 4 mg, Intravenous, Q6H  ipratropium-albuterol, 3 mL, Nebulization, 4x Daily - RT  lidocaine, 1 patch, Transdermal, Q24H  nicotine, 1 patch, Transdermal, Q24H  sodium chloride, 10 mL, Intravenous, Q12H      Infusions- sodium chloride, 100 mL/hr, Last Rate: 100 mL/hr (12/01/22 1139)       PRNs- •  acetaminophen  •  acetaminophen  •  dextrose  •  dextrose  •  glucagon (human recombinant)  •  HYDROcodone-acetaminophen  •  hydrOXYzine  •  melatonin  •  nitroglycerin  •  QUEtiapine  •  sodium chloride  •  sodium chloride  •  sodium chloride    Results Review:    I reviewed the patient's new clinical results.    Imaging Results (Last 24 Hours)     Procedure Component Value Units Date/Time    CT Head Without Contrast [[id number]] Collected: 12/01/22 1200     Updated: 12/01/22 1205    Narrative:      EXAM:    CT Head Without Intravenous Contrast     EXAM DATE:    12/1/2022 11:09 AM     CLINICAL HISTORY:    Head trauma, moderate-severe     TECHNIQUE:    Axial computed tomography images of the  head/brain without intravenous  contrast.  Sagittal and coronal reformatted images were created and  reviewed.  This CT exam was performed using one or more of the following  dose reduction techniques:  automated exposure control, adjustment of  the mA and/or kV according to patient size, and/or use of iterative  reconstruction technique.     COMPARISON:    12/01/2022 7:27 AM     FINDINGS:    Brain:  Blood along the right greater than left tentorium is  relatively stable.  Development of a 3.0 cm hemorrhagic contusion of the  right frontal lobe that was not present on the previous exam of focal  increased mass effect in this region.  Extra-axial blood in the right  frontal region is relatively stable.  Extra-axial blood in the left  anterior temporal region extending into the left frontal lobe is  essentially stable from previous.  Hemorrhagic subcortical parenchymal  contusions of the left frontal lobe appear relatively stable with no  size increase noted.  Blood along the right quadrigeminal plate cistern  is stable in configuration and thickness.  Basal cisterns remain  nondisplaced.  No significant white matter disease.    Midline shift:  There remains no evidence of midline shift.    Ventricles:  Increase in the amount of intraventricular hemorrhage  predominantly layering in the dependent portions of the lateral  ventricles and is probably related to transependymal absorption of blood  products.    Bones/joints:  Calvarial and facial bone fractures are again noted.    Soft tissues:  Large left frontal scalp hematoma and left periorbital  soft tissue hematoma.    Sinuses:  Unremarkable as visualized.  No acute sinusitis.    Mastoid air cells:  Unremarkable as visualized.  No mastoid effusion.    Orbits:  Ocular deviation to the left is incidentally noted. Correlate  with neurologic exam.       Impression:      1.  Intraparenchymal and extra-axial hemorrhages as detailed above.  2.  The extra-axial hemorrhages  appear fairly stable from the previous  exam but there has been interval development of a 3.0 cm hemorrhagic  contusion of the right frontal lobe with increased mass effect.  3.  Left cerebral hemispheric hemorrhagic contusions are stable in size.  4.  Large left frontal scalp hematoma appears stable.  5.  Increase in the amount of intraventricular blood layering in the  dependent portions of lateral ventricles probably related to  transependymal absorption of blood products.  6.  Nasal cisterns remain noneffaced and there is no midline shift  noted.     This report was finalized on 12/1/2022 12:03 PM by Dr. Yosi Schreiber MD.       CT Facial Bones Without Contrast [[id number]] Collected: 12/01/22 1139     Updated: 12/01/22 1203    Narrative:      EXAM:    CT Maxillofacial Without Intravenous Contrast     EXAM DATE:    12/1/2022 11:09 AM     CLINICAL HISTORY:    facial trauma with orbital and zygomatic fracture; J10.1-Influenza due  to other identified influenza virus with other respiratory  manifestations; R53.1-Weakness     TECHNIQUE:    Axial computed tomography images of the face without intravenous  contrast.  Sagittal and coronal reformatted images were created and  reviewed.  This CT exam was performed using one or more of the following  dose reduction techniques:  automated exposure control, adjustment of  the mA and/or kV according to patient size, and/or use of iterative  reconstruction technique.     COMPARISON:    No relevant prior studies available.     FINDINGS:    Bones/joints:  Nondisplaced fracture right medial orbital wall.   Nondisplaced fracture left medial orbital wall.  Nondisplaced bilateral  nasal bone fractures.  Nondisplaced left lateral orbital wall fracture.   Multipart left is a pneumatic arch fracture.  Fracture extending into  the base of the right middle cranial fossa.  Left posterior maxillary  wall fracture.  Left superior orbital rim fracture.  No evidence of  orbital wall  fractures.  Fracture involving the left frontal calvaria.    Soft tissues:  Large left frontal scalp hematoma.    Orbits:  Unremarkable.    Sinuses:  Air-fluid levels of the maxillary sinuses.  Air-fluid levels  of the sphenoid sinuses.  Subtotal opacification of the ethmoid air  cells.  Air-fluid level within the sphenoid sinus.    Brain:  Pneumocephalus in the left anterior frontal region.   Intracranial hemorrhages are noted. Refer to brain CT.    Other findings:  Mucoperiosteal thickening.       Impression:      1.  Multiple calvarial and facial bone fractures.  2.  Left tripod variant fracture is noted as detailed above.  3.  Right middle cranial fossa floor fracture.  4.  Left frontal calvarial fracture which extends from left superior  orbital rim.  5.  Bilateral medial orbital wall fractures but without evidence of  orbital floor fracture.  6.  Air-fluid levels throughout the paranasal sinuses.  7.  Bilateral nasal bone fractures.  8.  Large left periorbital and frontal scalp hematoma.  9.  Refer to CT brain for characterization of intracranial hemorrhages.     This report was finalized on 12/1/2022 12:00 PM by Dr. Yosi Schreiber MD.       CT Head Without Contrast [[id number]] Collected: 12/01/22 0802     Updated: 12/01/22 0827    Narrative:      CT Head WO, CT Max Facial Area WO    INDICATION:    Status post fall with left-sided facial bleeding and swelling.    TECHNIQUE:   CT of the head and maxillofacial bones without IV contrast. Coronal and sagittal reconstructions were obtained.  Radiation dose reduction techniques included automated exposure control or exposure modulation based on body size. Count of known CT and  cardiac nuc med studies performed in previous 12 months: 4.      COMPARISON:    Head CT 11/29/2022    FINDINGS: Severely motion degraded examination.  Head CT: Interhemispheric subdural hematoma measuring a maximum of 6 mm in width and extending along the vertex from anterior to posterior.  Multiple small focal areas of cortical and parenchymal hemorrhage left superior frontal lobe the largest measuring  up to 9 mm. Increased attenuation left dependent occipital lobe concerning for intraventricular hemorrhage but does not create a distinct fluid fluid level. Increased attenuation along the medial aspect of the right tentorium measuring up to 9 mm in  thickness and concerning for subdural hemorrhage along the tentorium at the level of the floyd.    No significant mass effect or midline shift. The bony calvarium appears intact. Questionable small amount of pneumocephalus along the anterior left frontal lobe. Large left frontal scalp hematoma measuring 7 cm AP by 1.5 cm in thickness.    Maxillofacial bones: Comminuted fracture of the left zygomatic arch. Approximately 2 mm of depression. Comminuted and impacted fracture of the lateral wall of the left orbit with up to 3 mm of impaction. Bilateral maxillary sinus fluid levels with high  attenuation compatible with hemorrhage. Minimally displaced fracture lateral wall of the left maxillary sinus. Small amount of ethmoid and sphenoid sinus fluid.    Extensive left periorbital soft tissue swelling. Small amount of retro-orbital emphysema. The globes appear intact.      Impression:      Complex head and maxillofacial injury on this severely motion degraded examination. Recommend repeat imaging when patient is stable.    Multifocal intracranial hemorrhage with interhemispheric subdural hemorrhage extending from anterior to posterior and measuring up to 6 mm in width. No significant mass effect.    Multifocal cortical and parenchymal hemorrhage left superior frontal lobe and a small focus right superior frontal lobe largest measuring up to 9 mm. No significant mass effect.    Suspected hemorrhage along the right tentorium which does fill the peripontine cistern and may produce some mass effect on the floyd.    High density material within the left dependent  occipital horn compatible with intraventricular hemorrhage.    Findings suspicious for pneumocephalus but no definite calvarial fracture.    Comminuted and slightly depressed zygomatic arch fracture. Minimally displaced fracture posterior left maxillary sinus wall. Mildly comminuted and impacted fracture lateral wall of the left orbit.    Large left frontal calvarial hematoma and extensive left periorbital soft tissue swelling and edema.    Likely hemorrhage within both maxillary sinuses with fluid in the ethmoid and sphenoid sinuses.    Findings called and discussed with the hospitalist at [facility] at the time of this dictation.    Signer Name: LEAH Costa MD   Signed: 12/1/2022 8:25 AM   Workstation Name: [id number]    [facility]    CT Facial Bones Without Contrast [[id number]] Collected: 12/01/22 0807     Updated: 12/01/22 0827    Narrative:      CT Head WO, CT Max Facial Area WO    INDICATION:    Status post fall with left-sided facial bleeding and swelling.    TECHNIQUE:   CT of the head and maxillofacial bones without IV contrast. Coronal and sagittal reconstructions were obtained.  Radiation dose reduction techniques included automated exposure control or exposure modulation based on body size. Count of known CT and  cardiac nuc med studies performed in previous 12 months: 4.      COMPARISON:    Head CT 11/29/2022    FINDINGS: Severely motion degraded examination.  Head CT: Interhemispheric subdural hematoma measuring a maximum of 6 mm in width and extending along the vertex from anterior to posterior. Multiple small focal areas of cortical and parenchymal hemorrhage left superior frontal lobe the largest measuring  up to 9 mm. Increased attenuation left dependent occipital lobe concerning for intraventricular hemorrhage but does not create a distinct fluid fluid level. Increased attenuation along the medial aspect of the right tentorium measuring up to 9 mm in  thickness  and concerning for subdural hemorrhage along the tentorium at the level of the floyd.    No significant mass effect or midline shift. The bony calvarium appears intact. Questionable small amount of pneumocephalus along the anterior left frontal lobe. Large left frontal scalp hematoma measuring 7 cm AP by 1.5 cm in thickness.    Maxillofacial bones: Comminuted fracture of the left zygomatic arch. Approximately 2 mm of depression. Comminuted and impacted fracture of the lateral wall of the left orbit with up to 3 mm of impaction. Bilateral maxillary sinus fluid levels with high  attenuation compatible with hemorrhage. Minimally displaced fracture lateral wall of the left maxillary sinus. Small amount of ethmoid and sphenoid sinus fluid.    Extensive left periorbital soft tissue swelling. Small amount of retro-orbital emphysema. The globes appear intact.      Impression:      Complex head and maxillofacial injury on this severely motion degraded examination. Recommend repeat imaging when patient is stable.    Multifocal intracranial hemorrhage with interhemispheric subdural hemorrhage extending from anterior to posterior and measuring up to 6 mm in width. No significant mass effect.    Multifocal cortical and parenchymal hemorrhage left superior frontal lobe and a small focus right superior frontal lobe largest measuring up to 9 mm. No significant mass effect.    Suspected hemorrhage along the right tentorium which does fill the peripontine cistern and may produce some mass effect on the floyd.    High density material within the left dependent occipital horn compatible with intraventricular hemorrhage.    Findings suspicious for pneumocephalus but no definite calvarial fracture.    Comminuted and slightly depressed zygomatic arch fracture. Minimally displaced fracture posterior left maxillary sinus wall. Mildly comminuted and impacted fracture lateral wall of the left orbit.    Large left frontal calvarial hematoma and  extensive left periorbital soft tissue swelling and edema.    Likely hemorrhage within both maxillary sinuses with fluid in the ethmoid and sphenoid sinuses.    Findings called and discussed with the hospitalist at [facility] at the time of this dictation.    Signer Name: LEAH Costa MD   Signed: 12/1/2022 8:25 AM   Workstation Name: [email or web address]    [facility]          CT Head Without Contrast    Result Date: 12/1/2022  1.  Intraparenchymal and extra-axial hemorrhages as detailed above. 2.  The extra-axial hemorrhages appear fairly stable from the previous exam but there has been interval development of a 3.0 cm hemorrhagic contusion of the right frontal lobe with increased mass effect. 3.  Left cerebral hemispheric hemorrhagic contusions are stable in size. 4.  Large left frontal scalp hematoma appears stable. 5.  Increase in the amount of intraventricular blood layering in the dependent portions of lateral ventricles probably related to transependymal absorption of blood products. 6.  Nasal cisterns remain noneffaced and there is no midline shift noted.  This report was finalized on 12/1/2022 12:03 PM by Dr. Yosi Schreiber MD.      CT Head Without Contrast    Result Date: 12/1/2022  Complex head and maxillofacial injury on this severely motion degraded examination. Recommend repeat imaging when patient is stable. Multifocal intracranial hemorrhage with interhemispheric subdural hemorrhage extending from anterior to posterior and measuring up to 6 mm in width. No significant mass effect. Multifocal cortical and parenchymal hemorrhage left superior frontal lobe and a small focus right superior frontal lobe largest measuring up to 9 mm. No significant mass effect. Suspected hemorrhage along the right tentorium which does fill the peripontine cistern and may produce some mass effect on the floyd. High density material within the left dependent occipital horn compatible with  intraventricular hemorrhage. Findings suspicious for pneumocephalus but no definite calvarial fracture. Comminuted and slightly depressed zygomatic arch fracture. Minimally displaced fracture posterior left maxillary sinus wall. Mildly comminuted and impacted fracture lateral wall of the left orbit. Large left frontal calvarial hematoma and extensive left periorbital soft tissue swelling and edema. Likely hemorrhage within both maxillary sinuses with fluid in the ethmoid and sphenoid sinuses. Findings called and discussed with the hospitalist at [facility] at the time of this dictation. Signer Name: LEAH Costa MD  Signed: 12/1/2022 8:25 AM  Workstation Name: [email or web address]  [facility]    CT Angiogram Neck    Result Date: 11/29/2022  1.  Moderate medium segment 50% stenosis proximal left ICA. No high-grade stenosis. 2.  Moderate plaque left greater than right carotid systems. No occlusion. 3.  No additional segments of hemodynamically significant stenosis noted. 4.  Pulmonary fibrosis and advanced emphysema. 5.  Other nonacute findings as above.  This report was finalized on 11/29/2022 4:13 PM by Dr. Yosi Schreiber MD.      CT Thoracic Spine Without Contrast    Result Date: 11/29/2022  1. No compression deformity appreciated though study is limited as discussed. Multilevel degenerative change and disc disease seen. 2. Marked abnormal appearance of the chest with chronic scarring and severe bronchiectasis in the right lung apex and traction of the right hilum superiorly. This was described on recent prior chest radiograph (not available for direct review) as pulmonary fibrosis. Recommend outpatient follow-up. 3. Linear opacity in the left mainstem bronchus is likely to be sputum. 4.   Severe coronary artery calcification present. These can be predictive of future all cause mortality and cardiovascular events and coronary artery calcification scoring may be clinically useful. Signer  Name: Zoey Farah MD  Signed: 11/29/2022 6:56 PM  Workstation Name: [facility]    CT Lumbar Spine Without Contrast    Result Date: 11/29/2022   No evidence of acute lumbar spine fracture.  Limited assessment as discussed with multilevel disc disease.  Signer Name: Zoey Farah MD  Signed: 11/29/2022 6:38 PM  Workstation Name: [facility]    MRI Brain Without Contrast    Result Date: 11/29/2022  1. There is moderate enlargement of the ventricles, which appears slightly out of proportion to the prominence of the sulcal spaces. While this is probably at least in part secondary to volume loss, some component of mild communicating hydrocephalus including NPH is not excluded, and clinical correlation is recommended. 2. Signal changes in the white matter compatible with ischemic changes related to small vessel disease. There is no restricted diffusion to suggest an acute ischemic event. Signer Name: Usha Zhou MD  Signed: 11/29/2022 5:59 PM  Workstation Name: [facility]    MRI Thoracic Spine Without Contrast    Result Date: 11/30/2022   1. Multilevel degenerative disc disease with degenerative endplate Modic changes noted. No levels of significant central canal or neural foraminal stenosis. 2. No compression fractures identified. 3. Fibrosis and volume loss of the right upper lobe with mildly dilated esophagus and probable gastroesophageal reflux.  This report was finalized on 11/30/2022 1:36 PM by Dr. Yosi Schreiber MD.      MRI Lumbar Spine Without Contrast    Result Date: 11/30/2022   1. Mild-moderate multilevel degenerative disc disease with facet arthropathy and central canal and neural foraminal stenosis described above. 2. No acute fracture or traumatic malalignment. No posterior disc herniations identified.  This report was finalized on 11/30/2022 1:34 PM by Dr. Yosi Schreiber MD.      XR  Chest 1 View    Result Date: 11/29/2022  1.  Cardiomegaly with interstitial thickening which may reflect changes of CHF/edema. 2.  Underlying changes of COPD and pulmonary fibrosis.  This report was finalized on 11/29/2022 4:42 PM by Dr. Yosi Schreiber MD.      US Renal Bilateral    Result Date: 11/30/2022  Unremarkable bilateral renal ultrasound.  This report was finalized on 11/30/2022 9:06 AM by Dr. Yosi Schreiber MD.      CT Facial Bones Without Contrast    Result Date: 12/1/2022  1.  Multiple calvarial and facial bone fractures. 2.  Left tripod variant fracture is noted as detailed above. 3.  Right middle cranial fossa floor fracture. 4.  Left frontal calvarial fracture which extends from left superior orbital rim. 5.  Bilateral medial orbital wall fractures but without evidence of orbital floor fracture. 6.  Air-fluid levels throughout the paranasal sinuses. 7.  Bilateral nasal bone fractures. 8.  Large left periorbital and frontal scalp hematoma. 9.  Refer to CT brain for characterization of intracranial hemorrhages.  This report was finalized on 12/1/2022 12:00 PM by Dr. Yosi Schreiber MD.      CT Facial Bones Without Contrast    Result Date: 12/1/2022  Complex head and maxillofacial injury on this severely motion degraded examination. Recommend repeat imaging when patient is stable. Multifocal intracranial hemorrhage with interhemispheric subdural hemorrhage extending from anterior to posterior and measuring up to 6 mm in width. No significant mass effect. Multifocal cortical and parenchymal hemorrhage left superior frontal lobe and a small focus right superior frontal lobe largest measuring up to 9 mm. No significant mass effect. Suspected hemorrhage along the right tentorium which does fill the peripontine cistern and may produce some mass effect on the floyd. High density material within the left dependent occipital horn compatible with intraventricular hemorrhage. Findings suspicious for pneumocephalus  but no definite calvarial fracture. Comminuted and slightly depressed zygomatic arch fracture. Minimally displaced fracture posterior left maxillary sinus wall. Mildly comminuted and impacted fracture lateral wall of the left orbit. Large left frontal calvarial hematoma and extensive left periorbital soft tissue swelling and edema. Likely hemorrhage within both maxillary sinuses with fluid in the ethmoid and sphenoid sinuses. Findings called and discussed with the hospitalist at [facility] at the time of this dictation. Signer Name: LEAH Costa MD  Signed: 12/1/2022 8:25 AM  Workstation [email or web address]  [facility]    CT Head Without Contrast Stroke Protocol    Result Date: 11/29/2022  1.  Diffuse cerebral atrophy and moderate chronic small vessel ischemic disease. 2.  No CT evidence of acute intracranial abnormality.  This report was finalized on 11/29/2022 3:31 PM by Dr. Yosi Schreiber MD.      CT Angiogram Head w AI Analysis of LVO    Result Date: 11/29/2022  1.  No large vessel occlusion. 2.  Moderate short segment 50% stenosis proximal left M1 MCA segment. No severe stenosis. 3.  Mild stenosis of proximal right M2 MCA segment. No hemodynamically significant stenosis. 4.  Other nonacute findings as above.  This report was finalized on 11/29/2022 4:17 PM by Dr. Yosi Schreiber MD.         Lab Results   Component Value Date    HGBA1C 5.80 (H) 11/30/2022      Lab Results   Component Value Date    CHOL 148 11/30/2022    TRIG 54 11/30/2022    HDL 47 11/30/2022    LDL 90 11/30/2022      Lab Results   Component Value Date    WBC 4.57 12/01/2022    HGB 11.9 (L) 12/01/2022    HCT 38.9 12/01/2022    MCV 92.5 12/01/2022    PLT 98 (L) 12/01/2022      Lab Results   Component Value Date    GLUCOSE 100 (H) 12/01/2022    BUN 24 (H) 12/01/2022    CREATININE 1.25 12/01/2022    BCR 19.2 12/01/2022    K 4.1 12/01/2022    CO2 19.9 (L) 12/01/2022    CALCIUM 7.8 (L) 12/01/2022    ALBUMIN 3.59  11/30/2022    AST 83 (H) 11/30/2022    ALT 16 11/30/2022      Lab Results   Component Value Date    TSH 1.620 11/30/2022     Lab Results   Component Value Date    YLLUNCQJ01 180 (L) 11/30/2022     No results found for: FOLATE          Assessment/Plan     Assessment/Plan:   Patient with history of progressive cognitive decline and falls presents yesterday with hypotension and two significant falls with bilateral LE weakness. 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 Noted to be volume depleted and tested positive for Flu A. Today feeling better, able to arise from bed unassisted.  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  Patient did have multiple falls at home, and has been more confused than usual, .  CTA of the head and neck with no evidence for proximal LVO - noted moderate left P2 stenosis. Differential considerations for presentation includes thoracic or lumbar myelopathy (possibly with superimposed contusive injury after serial falls), smoldering neurodegenerative disorder, and/or contributions from orthostatic hypotension.     Unfortunately patient suffered a fall earlier this morning.  Most recent CT showed multi compartment ICH with intraparenchymal and extra-axial hemorrhages, hemorrhagic contusion of right frontal lobe with increased mass-effect, left cerebral hemispheric hemorrhage that was stable from morning exam, the large left frontal scalp hematoma was stable from prior exam, there was an increase in the amount of intraventricular blood layering in the dependent portion of the lateral ventricles probably related to transependymal absorption of blood products.  Nasal cisterns were not affected and there was no midline shift.  CT facial bones revealed multiple  calvarial and facial bone fractures.  On exam patient was drowsy, only mumbled a few words.  Per nursing and family patient was more interactive before receiving IM Geodon.  He was spontaneously moving all extremities, but not following commands.  He made a few attempts at speaking but was very difficult to understand.          -Continue frequent neurochecks  -Patient was loaded with Keppra 1000 mg IV, maintenance dose of 500 mg IV every 12 hours first dose tonight at 2100  -Recommend avoiding first-generation antipsychotics in favor of second-generation antipsychotics if absolutely necessary for agitation  -Outpatient w/u for NPH concerns once recovered from current illness  -Delirium precautions: Lights on, shades open from 0700 to 1900 daily with frequent reorientation. Typical risk factors for delirium include advanced age, premorbid neurological disease, significant impairment in hearing or vision, critical illness, or prolonged hospital admission  -Attending in ongoing discussion with [facility] trauma team for possible transfer pending bed availability    The case was discussed with the patient, multiple family members at bedside, and Dr. Coy.      Yosi Gan, APRN  12/01/22  16:32 EST    This was an audio and video enabled telemedicine encounter.  Dr. Stevenson present for encounter via telemedicine.  Verbal consent taken.

## 2022-12-01 NOTE — PROGRESS NOTES
Unable to do EEG at this time due to pts head being wrapped with gauge due to bleeding.   Notified Dr. Coy.

## 2022-12-01 NOTE — PROGRESS NOTES
As I was leaving room 302-A and walking down the hallway this morning, I noticed a PCA in the doorway of room 316 calling for help.    Upon arriving in the room moments later, I noted the patient lying face down in the floor in a pool of blood and urine.  Multiple nursing staff members and PCA staff members were present at bedside and we were able to place the patient supine and then place him in bed.    Patient noted to have left sided periorbital edema and a dilated, slowly reactive pupil on the left side. Patient also noted to have a left temporal hematoma. Patient noted to have skin tears to the left side of his temple, skin around the left eye (laterally) and more inferior on the left maxillary region.  Patient noted to have bleeding in the posterior oropharynx and had probable posterior nasopharyngeal bleeding which I was unable to fully evaluate.  Patient's breath sounds are very coarse and audible.    Patient noted to have bilateral rales to auscultation and tachycardic heart rate with regular rhythm.  Patient's O2 saturation was difficult to  due to poor circulation but was most consistently reading in the low 80s.  Patient received deep oral suctioning by respiratory therapy x2.  The patient was then placed on 15 L bubble nasal cannula in addition to a 100% nonrebreather.      Patient was tachycardic with heart rates sustaining in the 160s to 170s.  Systolic blood pressure was 160s to 200 when checked manually in the right upper extremity.      Patient's most recent O2 saturation with the above oxygen modalities was 99%.      Pressure was held where bleeding occurred and I did glue the skin tear near the lateral aspect of the left eye.    Patient was able to follow some commands and was able to squeeze this provider's hands in addition to nursing staff.  He was also able to answer simple questions.  He was oriented to self.    Patient received 5 mg IV Lopressor x1 with resultant heart rates in the  80s to 90s  A STAT CT head w/o contrast and CT facial bones has been ordered  A STAT H/H and EKG have also been ordered

## 2022-12-01 NOTE — PROGRESS NOTES
[facility] HOSPITALIST PROGRESS NOTE     Patient Identification:  Name:  Demar Deleon  Age:  82 y.o.  Sex:  male  :  1940  MRN:  [id number]  Visit Number:  [id number]  ROOM: [address] [place] [address]     Primary Care Provider:  Provider, No Known    Length of stay in inpatient status:  1    Subjective     Chief Compliant:    Chief Complaint   Patient presents with   • Altered Mental Status       History of Presenting Illness: Patient seen and evaluated in follow-up for SAVANNAH with unknown baseline CKD with positive influenza status and generalized weakness with recurrent falls and concern for delirium at home.  Patient at time of exam today returned to baseline and no longer with any acute delirium or encephalopathy.  Patient requesting to return home at earliest possible date.    Objective     Current Hospital Meds:  aspirin, 325 mg, Oral, Daily  atorvastatin, 40 mg, Oral, Nightly  budesonide-formoterol, 2 puff, Inhalation, BID - RT  heparin (porcine), 5,000 Units, Subcutaneous, Q12H  ipratropium-albuterol, 3 mL, Nebulization, 4x Daily - RT  lidocaine, 1 patch, Transdermal, Q24H  nicotine, 1 patch, Transdermal, Q24H  sodium chloride, 10 mL, Intravenous, Q12H      sodium chloride, 100 mL/hr, Last Rate: 100 mL/hr (22)      ----------------------------------------------------------------------------------------------------------------------  Vital Signs:  Temp:  [98.3 °F (36.8 °C)-102.9 °F (39.4 °C)] 98.3 °F (36.8 °C)  Heart Rate:  [] 85  Resp:  [18-20] 18  BP: (108-162)/(51-93) 139/66  SpO2:  [88 %-96 %] 92 %  on  Flow (L/min):  [1-2] 1;   Device (Oxygen Therapy): room air  Body mass index is 17.77 kg/m².      Intake/Output Summary (Last 24 hours) at 2022  Last data filed at 2022 1820  Gross per 24 hour   Intake 530 ml   Output --   Net 530 ml      ----------------------------------------------------------------------------------------------------------------------  Physical  exam:  Constitutional: Elderly adult male resting comfortably in bed, NAD  HENT:  Head:  Normocephalic and atraumatic.  Mouth:  Moist mucous membranes.    Eyes:  Conjunctivae and EOM are normal. No scleral icterus.    Cardiovascular:  Normal rate, regular rhythm and normal heart sounds with no murmur.  Pulmonary/Chest:  No respiratory distress, faint end expiratory wheeze, no crackles, with reduced air movement.  Abdominal:  Soft.  Bowel sounds are normal.  No distension and no tenderness.   Musculoskeletal:  No edema, no tenderness, and no deformity.  No red or swollen joints anywhere.    Neurological:  Alert and oriented to person, place, and time.  No cranial nerve deficit.  No tongue deviation.  No facial droop.  No slurred speech. Intact Sensation throughout  Skin:  Skin is warm and dry. No rash or lesion noted. No pallor.   Peripheral vascular:  Pulses in all 4 extremities with no clubbing, no cyanosis, no edema.  Psychiatric: Appropriate mood and affect, pleasant.   ----------------------------------------------------------------------------------------------------------------------  WBC/HGB/HCT/PLT   5.04/13.0/39.5/114 (11/30 0008)  BUN/CREAT/GLUC/ALT/AST/ESTELLA/LIP    23/1.48/90/16/83/--/-- (11/30 0008)  LYTES - Na/K/Cl/CO2: 135*/4.2/102/20.2* (11/30 0008)        No results found for: URINECX  Blood Culture   Date Value Ref Range Status   11/29/2022 No growth at 24 hours  Preliminary   11/29/2022 No growth at 24 hours  Preliminary       I have personally looked at the labs and they are summarized above.  ----------------------------------------------------------------------------------------------------------------------  Detailed radiology reports for the last 24 hours:  CT Angiogram Neck    Result Date: 11/29/2022  1.  Moderate medium segment 50% stenosis proximal left ICA. No high-grade stenosis. 2.  Moderate plaque left greater than right carotid systems. No occlusion. 3.  No additional segments of  hemodynamically significant stenosis noted. 4.  Pulmonary fibrosis and advanced emphysema. 5.  Other nonacute findings as above.  This report was finalized on 11/29/2022 4:13 PM by Dr. Yosi Schreiber MD.      CT Thoracic Spine Without Contrast    Result Date: 11/29/2022  1. No compression deformity appreciated though study is limited as discussed. Multilevel degenerative change and disc disease seen. 2. Marked abnormal appearance of the chest with chronic scarring and severe bronchiectasis in the right lung apex and traction of the right hilum superiorly. This was described on recent prior chest radiograph (not available for direct review) as pulmonary fibrosis. Recommend outpatient follow-up. 3. Linear opacity in the left mainstem bronchus is likely to be sputum. 4.   Severe coronary artery calcification present. These can be predictive of future all cause mortality and cardiovascular events and coronary artery calcification scoring may be clinically useful. Signer Name: Zoey Farah MD  Signed: 11/29/2022 6:56 PM  Workstation Name: [facility]  Radiology [facility]    CT Lumbar Spine Without Contrast    Result Date: 11/29/2022   No evidence of acute lumbar spine fracture.  Limited assessment as discussed with multilevel disc disease.  Signer Name: Zoey Farah MD  Signed: 11/29/2022 6:38 PM  Workstation Name: [facility]    MRI Brain Without Contrast    Result Date: 11/29/2022  1. There is moderate enlargement of the ventricles, which appears slightly out of proportion to the prominence of the sulcal spaces. While this is probably at least in part secondary to volume loss, some component of mild communicating hydrocephalus including NPH is not excluded, and clinical correlation is recommended. 2. Signal changes in the white matter compatible with ischemic changes related to small vessel disease. There is no restricted diffusion to suggest an acute ischemic  event. Signer Name: Usha Zhou MD  Signed: 11/29/2022 5:59 PM  Workstation Name: [facility]  [facility]    MRI Thoracic Spine Without Contrast    Result Date: 11/30/2022   1. Multilevel degenerative disc disease with degenerative endplate Modic changes noted. No levels of significant central canal or neural foraminal stenosis. 2. No compression fractures identified. 3. Fibrosis and volume loss of the right upper lobe with mildly dilated esophagus and probable gastroesophageal reflux.  This report was finalized on 11/30/2022 1:36 PM by Dr. Yosi Schreiber MD.      MRI Lumbar Spine Without Contrast    Result Date: 11/30/2022   1. Mild-moderate multilevel degenerative disc disease with facet arthropathy and central canal and neural foraminal stenosis described above. 2. No acute fracture or traumatic malalignment. No posterior disc herniations identified.  This report was finalized on 11/30/2022 1:34 PM by Dr. Yosi Schreiber MD.      XR Chest 1 View    Result Date: 11/29/2022  1.  Cardiomegaly with interstitial thickening which may reflect changes of CHF/edema. 2.  Underlying changes of COPD and pulmonary fibrosis.  This report was finalized on 11/29/2022 4:42 PM by Dr. Yosi Schreiber MD.      US Renal Bilateral    Result Date: 11/30/2022  Unremarkable bilateral renal ultrasound.  This report was finalized on 11/30/2022 9:06 AM by Dr. Yosi Schreiber MD.      CT Head Without Contrast Stroke Protocol    Result Date: 11/29/2022  1.  Diffuse cerebral atrophy and moderate chronic small vessel ischemic disease. 2.  No CT evidence of acute intracranial abnormality.  This report was finalized on 11/29/2022 3:31 PM by Dr. Yosi Schreiber MD.      CT Angiogram Head w AI Analysis of LVO    Result Date: 11/29/2022  1.  No large vessel occlusion. 2.  Moderate short segment 50% stenosis proximal left M1 MCA segment. No severe stenosis. 3.  Mild stenosis of proximal right M2 MCA segment. No  hemodynamically significant stenosis. 4.  Other nonacute findings as above.  This report was finalized on 11/29/2022 4:17 PM by Dr. Yosi Schreiber MD.      Assessment & Plan      Influenza A positive  Acute kidney injury  Generalized weakness  Acute metabolic encephalopathy    -Patient positive for influenza A and family reports limited mobility at home with reduced oral intake and activity over the last several days.    -Patient initially at time of presentation with concerns for acute encephalopathy versus delirium but alert and oriented and returned to baseline at time of exam today.    -Suspect large component of influenza A compounding ongoing clinical decline in general independence given patient's advanced age and underlying lung disease    -Continue normal saline 100 cc an hour.  Creatinine improved from 1.8-1.48.      Moderate enlargement of ventricles  Recurrent falls at home    -Patient with MRI showing moderate enlargement of the ventricles likely secondary to volume loss versus some component of mild communicating hydrocephalus possibly NPH    -Patient however today with no clinical signs consistent with clear picture of NPH and given his marked improvement from time of presentation suspect more volume loss.    -Also noted on MRI is moderate stenosis of the proximal left ICA, M1 MCA, and mild stenosis of the proximal right M2 MCA.    -Continue aspirin and atorvastatin per neurology recommendations, appreciate their input and following along with the patient in consultation.    -PT and OT who recommends home with assist and home health physical therapy with strong family support versus SNF      COPD, mild exacerbation  Severe bronchiectasis right lung apex  Suspected underlying pulmonary fibrosis    -Patient currently requiring supplemental oxygen 2 L at time of my exam but able to be titrated down to 1 L and still maintaining adequate oxygenation    -Continue Symbicort and DuoNeb.  Hold off on systemic  corticosteroids.    Copied text in portions of the note has been reviewed and is accurate as of 11/30/22    VTE Prophylaxis:   Mechanical Order History:     None      Pharmalogical Order History:      Ordered     Dose Route Frequency Stop    11/29/22 2123  heparin (porcine) 5000 UNIT/ML injection 5,000 Units         5,000 Units SC Every 12 Hours Scheduled --                Disposition Home with home health physical therapy pending clinical course    Santosh Coy DO  [facility]  11/30/22  19:16 EST

## 2022-12-01 NOTE — NURSING NOTE
Approximately [id number] I had responded to a bed alarm going off in 316 A. The pt was sitting on the side of the bed and attempting to get up, but my nurse aide Arlene was in there with the pt. She left to finish giving report while I helped the pt scoot back into bed. Another nurse aide, Mariusz, entered the room asking if help was needed, and the pt was scooted back up into the bed safely and the bed alarm was replaced. The pt was observed a few minutes prior to exiting the room to ensure that the pt was not going to exit immediately. Approximately [id number] Ashley, nurse aide, approached the nurses station stating that the bed exit was on and the pt was lying face down in the floor and there was blood. Myself, and other staff ran to pt room to assess the situation. At this point, multiple staff assisted in turning the pt onto his back to assess where the blood was coming from. Pt was assisted to the bed. Multiple staff were handling different issues with the pt simultaneously. Dr. Chen was nearby and had stepped in to help. Dr. Chen applied pressure to L side of head/face and auscultated pt, Gayle DAWN set up suctioning equipment, Kristi RN and Barbara RN assisted in obtaining VS, Triny RN reestablished IV access. Dr. Chen put in orders for a CT. Dr. Chen also glued pt's head/ face/ and area surrounding eye. EKG was obtained prior to transport to CT. Respiratory in room and assisted with deep suctioning and oxygen needs. Pt's VSS, bleeding controlled, pt was able to follow commands and nod head in response to questions as well as short responses. Family was notified and they said they would arrive to hospital.

## 2022-12-02 NOTE — PROGRESS NOTES
Paged by nursing staff this a.m. for patient with recurrent bleeding from large hematoma of the head after coming back from CT scan of the head.  At time of my examination patient noted to be lethargic and in mild respiratory distress and was placed on heated high flow oxygen and decision was made to move patient to the critical care unit.  CT imaging was reviewed which showed bilateral subdural hematomas with intraventricular hemorrhage as well as what appeared to be a left orbital lateral wall fracture and zygomatic fracture as well.  Patient's oxygen saturation on ABG obtained in greater than 100 on PO2 with heated high flow nasal cannula as patient had had difficulty obtaining a reliable pleth for pulse oximetry.  Patient very lethargic but moving all extremities spontaneously and initially with a GCS of 8 and which later in the CCU did improve to 11 and then further to 13 by end of day.  Patient did have improvement with moving extremities on command.  Patient did remain with left lateral eye deviation of the left eye and initially there was some involving the right eye but this continually improved.  Repeat updated CT scans of facial bones and head showed stable bleed but new area of hemorrhagic contusion on the frontal lobe.  CT of the facial bones showed numerous facial fractures not previously seen on CT of the facial bone likely due to motion artifact as the first CT scan was significantly degraded from this.  Patient was kept in the CCU for close monitoring and discussion was had with multiple neurosurgical and trauma services at other institutions trying to arrange for recommendations and possible transfer.    Physical exam  General: I will refill adult male resting in bed in mild distress with large hematoma to the left lateral scalp near the temporal region with large periorbital hematoma although decreasing in size from initial presentation.  HEENT: Left eye left lateral deviation persistent however  right eye with improved range of motion and able to look slightly to the right on command.  Large hematoma as above 3 small lacerations to the lateral periorbital area and superior lateral cheek.  Cardiovascular: Regular rate and rhythm with no murmur, rub, or gallop.  Respiratory: Coarse breath sounds with improving rhonchi compared to initial with decreased air movement and breath sounds throughout occasional scant wheeze  Abdominal: Soft, nontender, nondistended, active bowel sounds present.  Neurological: Extraocular movements intact with the right eye as above left eye still with difficulty with rotation to the right.  Also noted sluggis sluggish reactivity on the left.  Cranial nerves otherwise grossly intact.  Spontaneously moving all 4 extremities able to follow some commands intermittently.  Dysarthria present.  Psychiatric: Patient intermittently following commands but intermittently delirious as well and attempting to pull tape and lines and Ware catheter.  Unable to assess further    Assessment and plan    Bilateral subdural hemorrhage  Intraventricular hemorrhage  Hemorrhagic contusion of the frontal lobe  Multiple facial and orbital fractures  Calvarial and cranial fossa fracture  Acute hypoxemic respiratory failure  Influenza A  Acute delirium with suspected underlying dementia  Seasonal allergies    Patient admitted to the CCU for close neurovascular monitoring.  Discussed with multiple outside facilities at both , [facility] team, [facility], [facility] and ultimately [facility].   of [facility] discussion with trauma surgeon recommended no acute surgical interventions needed for facial fractures given nondisplacement.  Discussed with neurosurgery however given the 2 cranial fractures and recommends transfer and acceptance to their facility for close monitoring as no neurosurgical services available at this hospital.  Continue normal saline at  100 cc an hour.  Additionally 10 mg dexamethasone administered one-time dose with every 6 4 mg dexamethasone for 24 hours afterwards for edema.  Additionally also Keppra prophylactically for seizure prophylaxis given traumatic hemorrhage and fractures.  Low-dose Geodon x1 for agitation and restlessness and delirium.  Patient currently pending bed assignment at [facility] but accepted to the neuro anesthesiology service with neurosurgery consulting in the neuro ICU.    Approximately 90 minutes of critical care time was spent today seeing and evaluating the patient repeatedly at bedside, discussing with family and updating on the patient's status, ordering and adjusting medications as well as reviewing imaging and coordinating care with other specialties as well as discussing with other specialties regarding transfer and recommendations for the acute stabilization of this patient to prevent further decompensation and death.

## 2022-12-02 NOTE — NURSING NOTE
Patient's daughter Leigh Ann called at this time to check on patient's discharge/transfer status. Informed her that the flight crew for the patient was present at bedside at this time and the patient will be leaving soon. Daughter agreeable.

## 2022-12-02 NOTE — DISCHARGE SUMMARY
[facility] HOSPITALISTS DISCHARGE SUMMARY    Patient Identification:  Name:  Demar Deleon  Age:  82 y.o.  Sex:  male  :  1940  MRN:  [id number]  Visit Number:  [id number]    Date of Admission: 2022  Date of Discharge:  2022     PCP: Provider, No Known    DISCHARGE DIAGNOSIS    Bilateral subdural hemorrhage  Hemorrhagic contusion of right frontal lobe  Left cerebral hemispheric hemorrhagic contusions  Left frontal scalp hematoma  Intraventricular hemorrhage  Left tripod variant fracture  LeFort type II fracture  Multiple calvarial and facial bone fractures  Traumatic mechanical fall  Influenza A positive  Acute kidney injury  Generalized weakness  Acute metabolic encephalopathy  Moderate enlargement of ventricles  Recurrent falls at home  COPD, mild exacerbation  Severe bronchiectasis right lung apex  Suspected underlying pulmonary fibrosis  CONSULTS     Neurology  COPD education  PROCEDURES PERFORMED      HOSPITAL COURSE  Patient is a 82 y.o. male presented to [facility] complaining of weakness and falls at home with some confusion.  Please see the admitting history and physical for further details.      Patient initially evaluated in the emergency department and code stroke called due to his confusion and weakness and falls.  Patient underwent neurological work-up in the emergency department and was unremarkable.  MRI of the brain did raise some question of possible ventriculomegaly.  Patient had had intermittent confusion developing that morning.  Patient was admitted to the hospitalist service for further treatment and management of influenza A with acute kidney injury in the setting of generalized weakness and acute metabolic encephalopathy and mild exacerbation of suspected underlying COPD.  Of note patient with no prior medical history however but there has been previous notation on imaging concerning for COPD as well has pulmonary fibrosis.  Patient initially  postadmission day 1 with a lot of improvement and possible candidate to return home within the next 24 to 48 hours.  However on the morning of 12/1/2022 patient with fall in room not with witnessed by nursing staff.  Patient was found on the floor with 3 lacerations to the side of the scalp as well as a large left scalp hematoma and periorbital hematoma.  Patient was noted to be confused and altered.  Patient's skin tears were glued as they were not big enough for any stitches.  Patient was scheduled for a CT scan of the head and was transported down to CT scan for imaging given his traumatic fall.  Shortly after going for CT scan hospitalist service was paged by nursing staff  for patient with recurrent bleeding from large hematoma of the head after coming back from CT scan of the head.  At time of  examination patient noted to be lethargic and in mild respiratory distress and was placed on heated high flow oxygen and decision was made to move patient to the critical care unit.  CT imaging was reviewed which showed bilateral subdural hematomas with intraventricular hemorrhage as well as what appeared to be a left orbital lateral wall fracture and zygomatic fracture as well.  Patient's oxygen saturation on ABG obtained in greater than 100 on PO2 with heated high flow nasal cannula as patient had had difficulty obtaining a reliable pleth for pulse oximetry.  Patient was very lethargic but moving all extremities spontaneously and initially with a GCS of 8  which later in the CCU did improve to 11 and then further to 13 by end of day.  Initially some concern for need to intubate patient however family was reluctant as patient had previously been DNR. Patient did have improvement with moving extremities on command.  Patient did remain with left lateral eye deviation of the left eye and initially there was some involving the right eye but this continually improved.  Neurology did see and evaluate the patient given the  left lateral eye deviation did bolus the patient 1 g of Keppra and patient was started on 500 mg every 12 Keppra after that for seizure prophylaxis.  Given patient's decreased mental status initially approximately 5 to 6 hours after the initial fall a repeat updated CT scans of facial bones and head was obtained which showed stable subdural hematomas but with a new area of hemorrhagic contusion on the frontal lobe.  There was noted increase in the amount of intraventricular blood layering in the dependent portions of the lateral ventricles but this was felt to be related to transependymal absorption of blood products. CT of the facial bones showed numerous facial fractures not previously seen on CT of the facial bone likely due to motion artifact as the first CT scan was significantly degraded from this.  Specifically facial bone imaging showed nondisplaced fracture of the right and left medial orbital walls with nondisplaced bilateral nasal bone fractures and nondisplaced left lateral orbital wall fracture with pneumatic arch fracture.  There was also fracture extending into the base of the right middle cranial fossa with left posterior maxillary wall fracture and left superior orbital rim fracture.  There was also fracture involving the left frontal calvaria.  Noted also was pneumocephalus in the left anterior frontal region with intracranial hemorrhages noted as on previous CT.   Patient's family was updated throughout this and were in agreement with transferring should the patient require.  Patient did have recurrent fever that did resolve with Tylenol midday and did not recur throughout the course of his hospitalization was felt to be secondary to his intracranial hemorrhage.  [facility] was initially contacted however initially were requesting patient to be intubated but also unable to provide a bed as they were currently on diversion.  [facility] and [facility] were also  contacted but deferred acceptance due to patient's complicated nature and no trauma coverage.  The [facility] was also contacted but deferred acceptance due to his multiple facial bone fractures and recommended acceptance at a higher level 1 trauma center such as at a [place].  The [facility] system was contacted and after speaking with on-call facial trauma surgery Dr. Sharif of ENT who recommended that patient would not need any surgical intervention for his facial fractures however recommended speaking with neurosurgery given his 2 cranial fractures involving the fossa and calvarium.  Discussed with neurosurgery, Dr. Stroud, who recommended due to lack of neurosurgical services at this hospital the patient be admitted to ICU at [facility] for close neurosurgical monitoring and availability of services should any more serious or aggressive interventions be needed.  Discussed with neuro anesthesiology service at [facility] ICU and patient was accepted by Dr. Mcmahan to the neuro ICU with neurosurgery service consulting.  Patient was subsequently discharged once bed assignment has been provided by [facility].    VITAL SIGNS:  Temp:  [98 °F (36.7 °C)-104 °F (40 °C)] 98.1 °F (36.7 °C)  Heart Rate:  [] 84  Resp:  [18-46] 20  BP: ()/() 95/58  SpO2:  [71 %-100 %] 99 %  on  Flow (L/min):  [10-45] 45;   Device (Oxygen Therapy): heated;high-flow nasal cannula    Body mass index is 18.51 kg/m².  Wt Readings from Last 3 Encounters:   12/01/22 61.9 kg (136 lb 7.4 oz)       PHYSICAL EXAM:  Constitutional: Frail elderly adult male resting in bed, no apparent distress.  However restless in bed moving extremities spontaneously.  HENT:  Head: Left periorbital hematoma improved from initial with large left scalp hematoma present.  3 small lacerations periorbital on the left.  Mouth:  Moist mucous membranes.    Dried blood  within the oropharynx.  Eyes: Intact pupillary reflex on the right sluggish on the left.  There is persistence of slight left lateral eye deviation although slightly improved from initial.  Right eye is no longer laterally deviated and has intact extraocular movements other than slight inability to fully rotate completely to the right.  Neck:  Neck supple.  No JVD present.    Cardiovascular:  Normal rate, regular rhythm and normal heart sounds with no murmur.  Pulmonary/Chest:  No respiratory distress, coarse breath sounds with rhonchi in the upper lung fields with occasional intermittent wheeze with reduced breath sounds.  Abdominal:  Soft.  Bowel sounds are normal.  No distension and no tenderness.   Musculoskeletal:  No edema, no tenderness, and no deformity.  No red or swollen joints anywhere.    Neurological: Alert but unable to assess orientation due to hypophonia.  Patient does attempt to mouth words and is able to follow some commands such as moving his feet and squeezing hands and opening eyes.  Cranial nerves grossly intact other than noted findings as above regarding the eyes. Spontaneously moving all 4 extremities.   Dysarthria and hypophonia present   Skin:  Skin is warm and dry. No rash noted.  Skin tears/lacerations and hematoma as noted above.  Peripheral vascular: Pulses in all 4 extremities with no clubbing, no cyanosis, no edema.    DISCHARGE DISPOSITION   Stable    DISCHARGE MEDICATIONS:     Discharge Medications      Patient Not Prescribed Medications Upon Discharge                TEST  RESULTS PENDING AT DISCHARGE  Pending Labs     Order Current Status    Blood Culture - Blood, Arm, Left Preliminary result    Blood Culture - Blood, Arm, Right Preliminary result           The ASCVD Risk score (Nish DK, et al., 2019) failed to calculate for the following reasons:    The 2019 ASCVD risk score is only valid for ages 40 to 79     CODE STATUS  Code Status and Medical Interventions:   Ordered at:  11/29/22 1938     Medical Intervention Limits:    NO intubation (DNI)    NO artificial nutrition     Code Status (Patient has no pulse and is not breathing):    No CPR (Do Not Attempt to Resuscitate)     Medical Interventions (Patient has pulse or is breathing):    Limited Support       Santosh Coy DO  [facility] Hospitalist  12/01/22  19:42 EST    Please note that this discharge summary required more than 30 minutes to complete.

## 2022-12-04 LAB
BACTERIA SPEC AEROBE CULT: NORMAL
BACTERIA SPEC AEROBE CULT: NORMAL
QT INTERVAL: 294 MS
QT INTERVAL: 326 MS
QTC INTERVAL: 392 MS
QTC INTERVAL: 420 MS